# Patient Record
Sex: FEMALE | Race: WHITE | NOT HISPANIC OR LATINO | Employment: OTHER | URBAN - METROPOLITAN AREA
[De-identification: names, ages, dates, MRNs, and addresses within clinical notes are randomized per-mention and may not be internally consistent; named-entity substitution may affect disease eponyms.]

---

## 2020-04-21 ENCOUNTER — HOSPITAL ENCOUNTER (OUTPATIENT)
Dept: CARDIOLOGY | Facility: HOSPITAL | Age: 85
Discharge: HOME | End: 2020-04-21
Attending: INTERNAL MEDICINE
Payer: MEDICARE

## 2020-04-21 DIAGNOSIS — I10 HYPERTENSION: ICD-10-CM

## 2020-04-21 DIAGNOSIS — I10 HYPERTENSION: Primary | ICD-10-CM

## 2020-04-21 PROCEDURE — 93005 ELECTROCARDIOGRAM TRACING: CPT

## 2020-07-25 ENCOUNTER — HOSPITAL ENCOUNTER (EMERGENCY)
Facility: HOSPITAL | Age: 85
Discharge: HOME | End: 2020-07-25
Attending: EMERGENCY MEDICINE
Payer: MEDICARE

## 2020-07-25 VITALS
TEMPERATURE: 98 F | OXYGEN SATURATION: 96 % | DIASTOLIC BLOOD PRESSURE: 74 MMHG | RESPIRATION RATE: 18 BRPM | HEART RATE: 74 BPM | WEIGHT: 250 LBS | SYSTOLIC BLOOD PRESSURE: 153 MMHG

## 2020-07-25 DIAGNOSIS — S05.12XA PERIORBITAL CONTUSION OF LEFT EYE, INITIAL ENCOUNTER: ICD-10-CM

## 2020-07-25 DIAGNOSIS — M25.571 BILATERAL ANKLE PAIN: ICD-10-CM
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
## 2020-07-25 DIAGNOSIS — M25.561 BILATERAL KNEE PAIN: ICD-10-CM

## 2020-07-25 DIAGNOSIS — R07.81 RIB PAIN ON LEFT SIDE: ICD-10-CM

## 2020-07-25 DIAGNOSIS — M25.572 BILATERAL ANKLE PAIN: ICD-10-CM

## 2020-07-25 DIAGNOSIS — M25.562 BILATERAL KNEE PAIN: ICD-10-CM

## 2020-07-25 LAB
ANION GAP SERPL CALC-SCNC: 10 MMOL/L (ref 8–16)
BASOPHILS # BLD AUTO: 0.04 K/UL (ref 0–0.2)
BASOPHILS NFR BLD: 0.7 % (ref 0–1.9)
BUN SERPL-MCNC: 19 MG/DL (ref 8–23)
CALCIUM SERPL-MCNC: 9.2 MG/DL (ref 8.7–10.5)
CHLORIDE SERPL-SCNC: 102 MMOL/L (ref 95–110)
CK SERPL-CCNC: 57 U/L (ref 20–180)
CO2 SERPL-SCNC: 26 MMOL/L (ref 23–29)
CREAT SERPL-MCNC: 0.7 MG/DL (ref 0.5–1.4)
DIFFERENTIAL METHOD: ABNORMAL
EOSINOPHIL # BLD AUTO: 0.1 K/UL (ref 0–0.5)
EOSINOPHIL NFR BLD: 2 % (ref 0–8)
ERYTHROCYTE [DISTWIDTH] IN BLOOD BY AUTOMATED COUNT: 13.8 % (ref 11.5–14.5)
EST. GFR  (AFRICAN AMERICAN): >60 ML/MIN/1.73 M^2
EST. GFR  (NON AFRICAN AMERICAN): >60 ML/MIN/1.73 M^2
GLUCOSE SERPL-MCNC: 107 MG/DL (ref 70–110)
HCT VFR BLD AUTO: 37.9 % (ref 37–48.5)
HGB BLD-MCNC: 12.3 G/DL (ref 12–16)
IMM GRANULOCYTES # BLD AUTO: 0.04 K/UL (ref 0–0.04)
IMM GRANULOCYTES NFR BLD AUTO: 0.7 % (ref 0–0.5)
INR PPP: 1 (ref 0.8–1.2)
LYMPHOCYTES # BLD AUTO: 1.6 K/UL (ref 1–4.8)
LYMPHOCYTES NFR BLD: 26.2 % (ref 18–48)
MCH RBC QN AUTO: 30.1 PG (ref 27–31)
MCHC RBC AUTO-ENTMCNC: 32.5 G/DL (ref 32–36)
MCV RBC AUTO: 93 FL (ref 82–98)
MONOCYTES # BLD AUTO: 0.6 K/UL (ref 0.3–1)
MONOCYTES NFR BLD: 9.6 % (ref 4–15)
NEUTROPHILS # BLD AUTO: 3.7 K/UL (ref 1.8–7.7)
NEUTROPHILS NFR BLD: 60.8 % (ref 38–73)
NRBC BLD-RTO: 0 /100 WBC
PLATELET # BLD AUTO: 242 K/UL (ref 150–350)
PMV BLD AUTO: 10.4 FL (ref 9.2–12.9)
POTASSIUM SERPL-SCNC: 4 MMOL/L (ref 3.5–5.1)
PROTHROMBIN TIME: 10.4 SEC (ref 9–12.5)
RBC # BLD AUTO: 4.08 M/UL (ref 4–5.4)
SODIUM SERPL-SCNC: 138 MMOL/L (ref 136–145)
WBC # BLD AUTO: 6.04 K/UL (ref 3.9–12.7)

## 2020-07-25 PROCEDURE — 71045 XR CHEST AP PORTABLE: ICD-10-PCS | Mod: 26,,, | Performed by: RADIOLOGY

## 2020-07-25 PROCEDURE — 73030 X-RAY EXAM OF SHOULDER: CPT | Mod: TC,50,FY

## 2020-07-25 PROCEDURE — 99284 EMERGENCY DEPT VISIT MOD MDM: CPT | Mod: 25

## 2020-07-25 PROCEDURE — 73610 XR ANKLE COMPLETE 3 VIEW BILATERAL: ICD-10-PCS | Mod: 26,50,, | Performed by: RADIOLOGY

## 2020-07-25 PROCEDURE — 72125 CT NECK SPINE W/O DYE: CPT | Mod: TC

## 2020-07-25 PROCEDURE — 25000003 PHARM REV CODE 250: Performed by: EMERGENCY MEDICINE

## 2020-07-25 PROCEDURE — 73610 X-RAY EXAM OF ANKLE: CPT | Mod: 26,50,, | Performed by: RADIOLOGY

## 2020-07-25 PROCEDURE — 70486 CT MAXILLOFACIAL WITHOUT CONTRAST: ICD-10-PCS | Mod: 26,,, | Performed by: RADIOLOGY

## 2020-07-25 PROCEDURE — 73562 X-RAY EXAM OF KNEE 3: CPT | Mod: 26,50,, | Performed by: RADIOLOGY

## 2020-07-25 PROCEDURE — 73030 XR SHOULDER COMPLETE 2 OR MORE VIEWS BILATERAL: ICD-10-PCS | Mod: 26,50,, | Performed by: RADIOLOGY

## 2020-07-25 PROCEDURE — 70486 CT MAXILLOFACIAL W/O DYE: CPT | Mod: TC

## 2020-07-25 PROCEDURE — 71045 X-RAY EXAM CHEST 1 VIEW: CPT | Mod: 26,,, | Performed by: RADIOLOGY

## 2020-07-25 PROCEDURE — 82550 ASSAY OF CK (CPK): CPT

## 2020-07-25 PROCEDURE — 72125 CT NECK SPINE W/O DYE: CPT | Mod: 26,,, | Performed by: RADIOLOGY

## 2020-07-25 PROCEDURE — 71045 X-RAY EXAM CHEST 1 VIEW: CPT | Mod: TC,FY

## 2020-07-25 PROCEDURE — 73562 X-RAY EXAM OF KNEE 3: CPT | Mod: TC,50,FY

## 2020-07-25 PROCEDURE — 70450 CT HEAD WITHOUT CONTRAST: ICD-10-PCS | Mod: 26,,, | Performed by: RADIOLOGY

## 2020-07-25 PROCEDURE — 70450 CT HEAD/BRAIN W/O DYE: CPT | Mod: 26,,, | Performed by: RADIOLOGY

## 2020-07-25 PROCEDURE — 80048 BASIC METABOLIC PNL TOTAL CA: CPT

## 2020-07-25 PROCEDURE — 73562 XR KNEE 3 VIEW BILATERAL: ICD-10-PCS | Mod: 26,50,, | Performed by: RADIOLOGY

## 2020-07-25 PROCEDURE — 85025 COMPLETE CBC W/AUTO DIFF WBC: CPT

## 2020-07-25 PROCEDURE — 70450 CT HEAD/BRAIN W/O DYE: CPT | Mod: TC

## 2020-07-25 PROCEDURE — 85610 PROTHROMBIN TIME: CPT

## 2020-07-25 PROCEDURE — 73030 X-RAY EXAM OF SHOULDER: CPT | Mod: 26,50,, | Performed by: RADIOLOGY

## 2020-07-25 PROCEDURE — 72125 CT CERVICAL SPINE WITHOUT CONTRAST: ICD-10-PCS | Mod: 26,,, | Performed by: RADIOLOGY

## 2020-07-25 PROCEDURE — 70486 CT MAXILLOFACIAL W/O DYE: CPT | Mod: 26,,, | Performed by: RADIOLOGY

## 2020-07-25 PROCEDURE — 73610 X-RAY EXAM OF ANKLE: CPT | Mod: TC,50,FY

## 2020-07-25 RX ORDER — HYDROCODONE BITARTRATE AND ACETAMINOPHEN 5; 325 MG/1; MG/1
1 TABLET ORAL
Status: COMPLETED | OUTPATIENT
Start: 2020-07-25 | End: 2020-07-25

## 2020-07-25 RX ADMIN — HYDROCODONE BITARTRATE AND ACETAMINOPHEN 1 TABLET: 5; 325 TABLET ORAL at 07:07

## 2020-07-25 NOTE — ED NOTES
[facility] phone number [phone]. Daughter in ED parking lot for any questions or needs.

## 2020-07-25 NOTE — ED PROVIDER NOTES
Encounter Date: 7/25/2020       History     Chief Complaint   Patient presents with    Fall     ground level trip and fall, down for unk length of time, unk loc, c/o pain in b/l arm, b/l knee, L shoulder, and bruising to left orbit     88-year-old female no medical history presents after falling earlier today.  She reports a mechanical fall.  She states she hit car alarm after she fell and her neighbor's heard the car alarm and checked on her.  There seems to have been a loss of consciousness for an uncertain amount of time.  Complaining of a headache also complaining of bilateral shoulder pain bilateral knee pain bilateral ankle pain.  She does admit she has chronic shoulder and knee pain.  It is uncertain whether the pain has changed after the fall.  She denies any neck pain at this time.  Also complaining of pain under the left breast but no chest pain or shortness of breath.          Review of patient's allergies indicates:   Allergen Reactions    Morphine     Sulfa (sulfonamide antibiotics)      No past medical history on file.  No past surgical history on file.  No family history on file.  Social History     Tobacco Use    Smoking status: Not on file   Substance Use Topics    Alcohol use: Not on file    Drug use: Not on file     Review of Systems   Constitutional: Negative for diaphoresis.   HENT: Negative for facial swelling.    Eyes: Negative for pain.   Respiratory: Negative for shortness of breath.    Cardiovascular: Positive for chest pain.   Gastrointestinal: Negative for abdominal pain.   Genitourinary: Negative for flank pain.   Musculoskeletal: Positive for arthralgias. Negative for back pain, joint swelling and neck pain.   Neurological: Negative for headaches.   Hematological: Does not bruise/bleed easily.   Psychiatric/Behavioral: Negative for confusion.   All other systems reviewed and are negative.      Physical Exam     Initial Vitals   BP Pulse Resp Temp SpO2   07/25/20 1817 07/25/20 1817  07/25/20 1817 07/25/20 1817 07/25/20 1808   (!) 200/110 68 18 98.2 °F (36.8 °C) 95 %      MAP       --                Physical Exam    Nursing note and vitals reviewed.  Constitutional: She appears well-developed and well-nourished. She is not diaphoretic.  Non-toxic appearance. She does not have a sickly appearance. She does not appear ill. No distress.   HENT:   Head: Normocephalic. Head is with contusion. Head is without raccoon's eyes, without Gilmore's sign and without laceration.       Minimal amount of left periorbital bruising   Eyes: EOM are normal. Pupils are equal, round, and reactive to light. Right pupil is round and reactive. Left pupil is round and reactive. Pupils are equal.   Neck: Normal range of motion and full passive range of motion without pain. Neck supple. No spinous process tenderness and no muscular tenderness present. Normal range of motion present. No neck rigidity.   Cardiovascular: Normal rate, regular rhythm and normal heart sounds.   Pulmonary/Chest: Breath sounds normal. No respiratory distress. She has no decreased breath sounds.   Breath sounds clear bilaterally   Abdominal: Soft. She exhibits no distension. There is no abdominal tenderness.   Musculoskeletal: No edema.      Right shoulder: She exhibits decreased range of motion, tenderness and bony tenderness. She exhibits no swelling.      Left shoulder: She exhibits decreased range of motion, tenderness and bony tenderness.      Right elbow: Normal.     Left elbow: Normal.      Right wrist: Normal.      Left wrist: Normal.      Right hip: Normal.      Left hip: Normal.      Right knee: She exhibits normal range of motion, no swelling and no effusion. Tenderness found.      Left knee: She exhibits normal range of motion, no swelling and no effusion. Tenderness found.        Right ankle: Tenderness.        Left ankle: Tenderness.      Comments: Patient has tenderness to both shoulders both knees both ankles.  No obvious  deformities.  Decreased ability to elevate both arms secondary to pain.  Minimal tenderness bilateral deltoids.    Postoperative right and left TKA.  No obvious trauma to the knees but globally tender    Both ankles globally tender without any obvious trauma noted   Neurological: She is alert and oriented to person, place, and time.   Skin: Skin is warm and dry.   Psychiatric: She has a normal mood and affect. Her behavior is normal. Judgment and thought content normal.         ED Course   Procedures  Labs Reviewed - No data to display       Imaging Results    None                            ED Course as of Jul 25 2128   Sat Jul 25, 2020 1823 BP(!): 200/110 [EF]   1823 Temp: 98.2 °F (36.8 °C) [EF]   1823 Temp src: Oral [EF]   1823 Pulse: 68 [EF]   1823 Resp: 18 [EF]   1823 SpO2: 95 % [EF]   1908 CPK: 57 [EF]   1908 Sodium: 138 [EF]   1908 Potassium: 4.0 [EF]   1908 Chloride: 102 [EF]   1908 CO2: 26 [EF]   1908 Glucose: 107 [EF]   1908 BUN, Bld: 19 [EF]   1908 Creatinine: 0.7 [EF]   1908 Calcium: 9.2 [EF]   1908 Hemoglobin: 12.3 [EF]   1928 IMPRESSION:  1. No acute fracture or malalignment.  2. 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  Thank you for allowing us to participate in the care of your patient.  Dictated and Authenticated by: Marybeth Steen MD  07/25/2020 7:28 PM Central Time ([place] & [place])    [EF]   1929 IMPRESSION:  1. No acute intracranial findings.  2. Mild chronic microangiopathic changes, cortical volume loss.  Thank you for allowing us to participate in the care of your patient.  Dictated and Authenticated by: Marybeth Steen MD  07/25/2020 7:24 PM Central Time ([place] & [place])    [EF]   1936 IMPRESSION:  1. No acute fracture or dislocation.  2. Soft tissue contusions.  Thank you for allowing us to participate in the care of your patient.  Dictated and Authenticated by: Marybeth Steen MD  07/25/2020 7:31 PM Central Time ([place] & [place])    [EF]    1941 Family updated by nursing staff    [EF]   2002 Ankles normal xray    [EF]   2003 B knees normal    [EF]   2005 Unable to view any old images      [EF]   2006 B shoulders nothing acute, severe arthritic changes, no fx or dislocation seen    [EF]   2007 No acute findings on any imaging studies    [EF]   2044 BP(!): 153/74 [EF]   2103 Patient wasStand up and ambulate with minimal assistance.  She can be discharged home.    [EF]      ED Course User Index  [EF] Ricardo Villa MD                Clinical Impression:       ICD-10-CM ICD-9-CM   1. Fall, initial encounter  W19.XXXA E888.9   2. Bilateral knee pain  M25.561 719.46    M25.562    3. Bilateral ankle pain  M25.571 719.47    M25.572    4. Rib pain on left side  R07.81 786.50   5. Periorbital contusion of left eye, initial encounter  S05.12XA 921.1                         88-year-old female presents after mechanical fall.  Complaining of a very mild headache and also bilateral shoulder bilateral knee bilateral ankle pain.  All imaging studies unremarkable.  Labs are normal.  Well-appearing in the ER.  On discharge she is ambulatory with a walker.  There is no sign of any posttraumatic injury at all.  X-rays will be formally read tomorrow.  She is advised if there is a discrepancy she will be contacted.  I see no reason for a sling or splint at this time.  My read of x-rays demonstrate no acute findings.  Clinically she does not demonstrate any evidence of a fracture or dislocation.  CT head neck max face all read by Radiology are unremarkable.  No indication for any further studies or admission for any reason.     Ricardo Villa MD  07/25/20 4228

## 2020-10-22 DIAGNOSIS — H50.22 HYPOTROPIA OF LEFT EYE: Primary | ICD-10-CM

## 2020-11-04 ENCOUNTER — HOSPITAL ENCOUNTER (OUTPATIENT)
Dept: RADIOLOGY | Facility: HOSPITAL | Age: 85
Discharge: HOME | End: 2020-11-04
Attending: OPHTHALMOLOGY
Payer: MEDICARE

## 2020-11-04 DIAGNOSIS — H50.22 HYPOTROPIA OF LEFT EYE: ICD-10-CM

## 2020-11-04 PROCEDURE — 70553 MRI BRAIN W WO CONTRAST: ICD-10-PCS | Mod: 26,,, | Performed by: RADIOLOGY

## 2020-11-04 PROCEDURE — 25500020 PHARM REV CODE 255

## 2020-11-04 PROCEDURE — 70553 MRI BRAIN STEM W/O & W/DYE: CPT | Mod: TC

## 2020-11-04 PROCEDURE — A9585 GADOBUTROL INJECTION: HCPCS

## 2020-11-04 PROCEDURE — 70543 MRI ORBITS W W/O CONTRAST: ICD-10-PCS | Mod: 26,,, | Performed by: RADIOLOGY

## 2020-11-04 PROCEDURE — 70553 MRI BRAIN STEM W/O & W/DYE: CPT | Mod: 26,,, | Performed by: RADIOLOGY

## 2020-11-04 PROCEDURE — 70543 MRI ORBT/FAC/NCK W/O &W/DYE: CPT | Mod: 26,,, | Performed by: RADIOLOGY

## 2020-11-04 PROCEDURE — 70543 MRI ORBT/FAC/NCK W/O &W/DYE: CPT | Mod: TC

## 2020-11-04 RX ORDER — GADOBUTROL 604.72 MG/ML
10 INJECTION INTRAVENOUS
Status: COMPLETED | OUTPATIENT
Start: 2020-11-04 | End: 2020-11-04

## 2020-11-04 RX ADMIN — GADOBUTROL 10 ML: 604.72 INJECTION INTRAVENOUS at 03:11

## 2021-11-07 ENCOUNTER — HOSPITAL ENCOUNTER (INPATIENT)
Facility: HOSPITAL | Age: 86
LOS: 5 days | Discharge: NURSING, REHAB OR LONG-TERM CARE | DRG: 083 | End: 2021-11-12
Attending: EMERGENCY MEDICINE | Admitting: PSYCHIATRY & NEUROLOGY
Payer: MEDICARE

## 2021-11-07 DIAGNOSIS — I34.1 MITRAL VALVE PROLAPSE: ICD-10-CM

## 2021-11-07 DIAGNOSIS — S06.5XAA SDH (SUBDURAL HEMATOMA): ICD-10-CM

## 2021-11-07 DIAGNOSIS — S06.5XAA SUBDURAL HEMATOMA, ACUTE: ICD-10-CM

## 2021-11-07 DIAGNOSIS — I10 HYPERTENSION, UNSPECIFIED TYPE: ICD-10-CM

## 2021-11-07 PROBLEM — C50.119 PRIMARY MALIGNANT NEOPLASM OF CENTRAL PORTION OF FEMALE BREAST: Status: ACTIVE | Noted: 2021-11-07

## 2021-11-07 PROBLEM — M71.9 BURSITIS: Status: ACTIVE | Noted: 2021-11-07

## 2021-11-07 PROBLEM — E87.6 HYPOKALEMIA: Status: ACTIVE | Noted: 2021-11-07

## 2021-11-07 PROBLEM — G56.00 CARPAL TUNNEL SYNDROME: Status: ACTIVE | Noted: 2021-11-07

## 2021-11-07 PROBLEM — F43.21 ADJUSTMENT DISORDER WITH DEPRESSED MOOD: Status: ACTIVE | Noted: 2021-11-07

## 2021-11-07 PROBLEM — J43.9 PULMONARY EMPHYSEMA: Status: ACTIVE | Noted: 2021-11-07

## 2021-11-07 LAB
ABO + RH BLD: NORMAL
ALBUMIN SERPL BCP-MCNC: 3.3 G/DL (ref 3.5–5.2)
ALP SERPL-CCNC: 86 U/L (ref 55–135)
ALT SERPL W/O P-5'-P-CCNC: 12 U/L (ref 10–44)
ANION GAP SERPL CALC-SCNC: 10 MMOL/L (ref 8–16)
APTT BLDCRRT: 21.9 SEC (ref 21–32)
AST SERPL-CCNC: 16 U/L (ref 10–40)
BACTERIA #/AREA URNS AUTO: ABNORMAL /HPF
BASOPHILS # BLD AUTO: 0.04 K/UL (ref 0–0.2)
BASOPHILS NFR BLD: 0.8 % (ref 0–1.9)
BILIRUB SERPL-MCNC: 0.7 MG/DL (ref 0.1–1)
BILIRUB UR QL STRIP: NEGATIVE
BLD GP AB SCN CELLS X3 SERPL QL: NORMAL
BUN SERPL-MCNC: 21 MG/DL (ref 8–23)
CALCIUM SERPL-MCNC: 9.3 MG/DL (ref 8.7–10.5)
CHLORIDE SERPL-SCNC: 106 MMOL/L (ref 95–110)
CHOLEST SERPL-MCNC: 187 MG/DL (ref 120–199)
CHOLEST/HDLC SERPL: 2.5 {RATIO} (ref 2–5)
CLARITY UR REFRACT.AUTO: ABNORMAL
CO2 SERPL-SCNC: 25 MMOL/L (ref 23–29)
COLOR UR AUTO: YELLOW
CREAT SERPL-MCNC: 0.8 MG/DL (ref 0.5–1.4)
CTP QC/QA: YES
DIFFERENTIAL METHOD: ABNORMAL
EOSINOPHIL # BLD AUTO: 0.1 K/UL (ref 0–0.5)
EOSINOPHIL NFR BLD: 1.7 % (ref 0–8)
ERYTHROCYTE [DISTWIDTH] IN BLOOD BY AUTOMATED COUNT: 19.9 % (ref 11.5–14.5)
EST. GFR  (AFRICAN AMERICAN): >60 ML/MIN/1.73 M^2
EST. GFR  (NON AFRICAN AMERICAN): >60 ML/MIN/1.73 M^2
ESTIMATED AVG GLUCOSE: 108 MG/DL (ref 68–131)
GLUCOSE SERPL-MCNC: 103 MG/DL (ref 70–110)
GLUCOSE UR QL STRIP: NEGATIVE
HBA1C MFR BLD: 5.4 % (ref 4–5.6)
HCT VFR BLD AUTO: 32.1 % (ref 37–48.5)
HDLC SERPL-MCNC: 74 MG/DL (ref 40–75)
HDLC SERPL: 39.6 % (ref 20–50)
HGB BLD-MCNC: 10.5 G/DL (ref 12–16)
HGB UR QL STRIP: ABNORMAL
HYALINE CASTS UR QL AUTO: 3 /LPF
IMM GRANULOCYTES # BLD AUTO: 0.06 K/UL (ref 0–0.04)
IMM GRANULOCYTES NFR BLD AUTO: 1.1 % (ref 0–0.5)
INR PPP: 1 (ref 0.8–1.2)
KETONES UR QL STRIP: NEGATIVE
LDLC SERPL CALC-MCNC: 101.8 MG/DL (ref 63–159)
LEUKOCYTE ESTERASE UR QL STRIP: ABNORMAL
LYMPHOCYTES # BLD AUTO: 1.4 K/UL (ref 1–4.8)
LYMPHOCYTES NFR BLD: 27.4 % (ref 18–48)
MCH RBC QN AUTO: 32 PG (ref 27–31)
MCHC RBC AUTO-ENTMCNC: 32.7 G/DL (ref 32–36)
MCV RBC AUTO: 98 FL (ref 82–98)
MICROSCOPIC COMMENT: ABNORMAL
MONOCYTES # BLD AUTO: 0.5 K/UL (ref 0.3–1)
MONOCYTES NFR BLD: 10.1 % (ref 4–15)
NEUTROPHILS # BLD AUTO: 3.1 K/UL (ref 1.8–7.7)
NEUTROPHILS NFR BLD: 58.9 % (ref 38–73)
NITRITE UR QL STRIP: NEGATIVE
NONHDLC SERPL-MCNC: 113 MG/DL
NRBC BLD-RTO: 0 /100 WBC
PH UR STRIP: 5 [PH] (ref 5–8)
PLATELET # BLD AUTO: 203 K/UL (ref 150–450)
PMV BLD AUTO: 10.2 FL (ref 9.2–12.9)
POTASSIUM SERPL-SCNC: 3.4 MMOL/L (ref 3.5–5.1)
PROT SERPL-MCNC: 6.1 G/DL (ref 6–8.4)
PROT UR QL STRIP: ABNORMAL
PROTHROMBIN TIME: 11.4 SEC (ref 9–12.5)
RBC # BLD AUTO: 3.28 M/UL (ref 4–5.4)
RBC #/AREA URNS AUTO: >100 /HPF (ref 0–4)
SARS-COV-2 RDRP RESP QL NAA+PROBE: NEGATIVE
SODIUM SERPL-SCNC: 141 MMOL/L (ref 136–145)
SP GR UR STRIP: 1.01 (ref 1–1.03)
SQUAMOUS #/AREA URNS AUTO: 0 /HPF
TRIGL SERPL-MCNC: 56 MG/DL (ref 30–150)
TSH SERPL DL<=0.005 MIU/L-ACNC: 0.98 UIU/ML (ref 0.4–4)
URN SPEC COLLECT METH UR: ABNORMAL
WBC # BLD AUTO: 5.26 K/UL (ref 3.9–12.7)
WBC #/AREA URNS AUTO: 17 /HPF (ref 0–5)

## 2021-11-07 PROCEDURE — 99223 1ST HOSP IP/OBS HIGH 75: CPT | Mod: GC,,, | Performed by: NEUROLOGICAL SURGERY

## 2021-11-07 PROCEDURE — 80053 COMPREHEN METABOLIC PANEL: CPT | Performed by: PHYSICIAN ASSISTANT

## 2021-11-07 PROCEDURE — 81001 URINALYSIS AUTO W/SCOPE: CPT | Performed by: NURSE PRACTITIONER

## 2021-11-07 PROCEDURE — 93010 ELECTROCARDIOGRAM REPORT: CPT | Mod: ,,, | Performed by: INTERNAL MEDICINE

## 2021-11-07 PROCEDURE — 87086 URINE CULTURE/COLONY COUNT: CPT | Performed by: NURSE PRACTITIONER

## 2021-11-07 PROCEDURE — 80061 LIPID PANEL: CPT | Performed by: PHYSICIAN ASSISTANT

## 2021-11-07 PROCEDURE — 99285 EMERGENCY DEPT VISIT HI MDM: CPT | Mod: 25

## 2021-11-07 PROCEDURE — 93010 EKG 12-LEAD: ICD-10-PCS | Mod: ,,, | Performed by: INTERNAL MEDICINE

## 2021-11-07 PROCEDURE — U0002 COVID-19 LAB TEST NON-CDC: HCPCS | Performed by: PHYSICIAN ASSISTANT

## 2021-11-07 PROCEDURE — 85730 THROMBOPLASTIN TIME PARTIAL: CPT | Performed by: PHYSICIAN ASSISTANT

## 2021-11-07 PROCEDURE — 86900 BLOOD TYPING SEROLOGIC ABO: CPT | Performed by: PHYSICIAN ASSISTANT

## 2021-11-07 PROCEDURE — 96375 TX/PRO/DX INJ NEW DRUG ADDON: CPT

## 2021-11-07 PROCEDURE — 85025 COMPLETE CBC W/AUTO DIFF WBC: CPT | Performed by: PHYSICIAN ASSISTANT

## 2021-11-07 PROCEDURE — 99223 PR INITIAL HOSPITAL CARE,LEVL III: ICD-10-PCS | Mod: GC,,, | Performed by: NEUROLOGICAL SURGERY

## 2021-11-07 PROCEDURE — 63600175 PHARM REV CODE 636 W HCPCS: Mod: JG | Performed by: PHYSICIAN ASSISTANT

## 2021-11-07 PROCEDURE — 93005 ELECTROCARDIOGRAM TRACING: CPT

## 2021-11-07 PROCEDURE — 83036 HEMOGLOBIN GLYCOSYLATED A1C: CPT | Performed by: PHYSICIAN ASSISTANT

## 2021-11-07 PROCEDURE — 12000002 HC ACUTE/MED SURGE SEMI-PRIVATE ROOM

## 2021-11-07 PROCEDURE — 84443 ASSAY THYROID STIM HORMONE: CPT | Performed by: PHYSICIAN ASSISTANT

## 2021-11-07 PROCEDURE — 25000003 PHARM REV CODE 250: Performed by: PHYSICIAN ASSISTANT

## 2021-11-07 PROCEDURE — 85610 PROTHROMBIN TIME: CPT | Performed by: PHYSICIAN ASSISTANT

## 2021-11-07 PROCEDURE — 99291 CRITICAL CARE FIRST HOUR: CPT | Mod: CS,,, | Performed by: EMERGENCY MEDICINE

## 2021-11-07 PROCEDURE — 99291 PR CRITICAL CARE, E/M 30-74 MINUTES: ICD-10-PCS | Mod: CS,,, | Performed by: EMERGENCY MEDICINE

## 2021-11-07 PROCEDURE — 96365 THER/PROPH/DIAG IV INF INIT: CPT

## 2021-11-07 RX ORDER — SODIUM,POTASSIUM PHOSPHATES 280-250MG
2 POWDER IN PACKET (EA) ORAL
Status: DISCONTINUED | OUTPATIENT
Start: 2021-11-07 | End: 2021-11-10

## 2021-11-07 RX ORDER — FUROSEMIDE 40 MG/1
TABLET ORAL
COMMUNITY
Start: 2021-07-09 | End: 2022-09-01 | Stop reason: SDUPTHER

## 2021-11-07 RX ORDER — ASPIRIN 81 MG/1
81 TABLET ORAL
Status: ON HOLD | COMMUNITY
Start: 2021-01-28 | End: 2021-11-11 | Stop reason: HOSPADM

## 2021-11-07 RX ORDER — ALBUTEROL SULFATE 90 UG/1
1 AEROSOL, METERED RESPIRATORY (INHALATION)
COMMUNITY
Start: 2021-03-11

## 2021-11-07 RX ORDER — HYDRALAZINE HYDROCHLORIDE 50 MG/1
50 TABLET, FILM COATED ORAL
Status: ON HOLD | COMMUNITY
Start: 2021-03-11 | End: 2021-11-11 | Stop reason: SDUPTHER

## 2021-11-07 RX ORDER — AMLODIPINE BESYLATE 5 MG/1
TABLET ORAL
COMMUNITY

## 2021-11-07 RX ORDER — AMOXICILLIN 250 MG
1 CAPSULE ORAL 2 TIMES DAILY
Status: DISCONTINUED | OUTPATIENT
Start: 2021-11-07 | End: 2021-11-12 | Stop reason: HOSPADM

## 2021-11-07 RX ORDER — CAPECITABINE 500 MG/1
1500 TABLET, FILM COATED ORAL
COMMUNITY
Start: 2021-08-23

## 2021-11-07 RX ORDER — LOSARTAN POTASSIUM AND HYDROCHLOROTHIAZIDE 12.5; 1 MG/1; MG/1
TABLET ORAL
COMMUNITY

## 2021-11-07 RX ORDER — CITALOPRAM 10 MG/1
TABLET ORAL
COMMUNITY

## 2021-11-07 RX ORDER — LABETALOL HCL 20 MG/4 ML
10 SYRINGE (ML) INTRAVENOUS EVERY 4 HOURS PRN
Status: DISCONTINUED | OUTPATIENT
Start: 2021-11-07 | End: 2021-11-08

## 2021-11-07 RX ORDER — LANOLIN ALCOHOL/MO/W.PET/CERES
800 CREAM (GRAM) TOPICAL
Status: DISCONTINUED | OUTPATIENT
Start: 2021-11-07 | End: 2021-11-10

## 2021-11-07 RX ORDER — CLONIDINE HYDROCHLORIDE 0.1 MG/1
0.1 TABLET ORAL
COMMUNITY
Start: 2021-03-11

## 2021-11-07 RX ORDER — NITROGLYCERIN 0.4 MG/1
TABLET SUBLINGUAL
COMMUNITY
Start: 2021-06-10

## 2021-11-07 RX ORDER — SODIUM CHLORIDE 0.9 % (FLUSH) 0.9 %
10 SYRINGE (ML) INJECTION
Status: DISCONTINUED | OUTPATIENT
Start: 2021-11-07 | End: 2021-11-12 | Stop reason: HOSPADM

## 2021-11-07 RX ORDER — LEVETIRACETAM 500 MG/1
500 TABLET ORAL 2 TIMES DAILY
Status: DISCONTINUED | OUTPATIENT
Start: 2021-11-07 | End: 2021-11-09

## 2021-11-07 RX ORDER — POLYETHYLENE GLYCOL 3350 17 G/17G
17 POWDER, FOR SOLUTION ORAL DAILY
Status: DISCONTINUED | OUTPATIENT
Start: 2021-11-08 | End: 2021-11-12 | Stop reason: HOSPADM

## 2021-11-07 RX ORDER — ACETAMINOPHEN 325 MG/1
650 TABLET ORAL EVERY 6 HOURS PRN
Status: DISCONTINUED | OUTPATIENT
Start: 2021-11-07 | End: 2021-11-12 | Stop reason: HOSPADM

## 2021-11-07 RX ADMIN — DESMOPRESSIN ACETATE 34.02 MCG: 4 SOLUTION INTRAVENOUS at 04:11

## 2021-11-08 LAB
ALBUMIN SERPL BCP-MCNC: 3.2 G/DL (ref 3.5–5.2)
ALP SERPL-CCNC: 80 U/L (ref 55–135)
ALT SERPL W/O P-5'-P-CCNC: 11 U/L (ref 10–44)
ANION GAP SERPL CALC-SCNC: 8 MMOL/L (ref 8–16)
APTT BLDCRRT: 23.9 SEC (ref 21–32)
ASCENDING AORTA: 2.62 CM
AST SERPL-CCNC: 15 U/L (ref 10–40)
AV INDEX (PROSTH): 0.61
AV MEAN GRADIENT: 6 MMHG
AV PEAK GRADIENT: 10 MMHG
AV VALVE AREA: 1.78 CM2
AV VELOCITY RATIO: 0.61
BACTERIA UR CULT: NO GROWTH
BASOPHILS # BLD AUTO: 0.04 K/UL (ref 0–0.2)
BASOPHILS NFR BLD: 0.7 % (ref 0–1.9)
BILIRUB SERPL-MCNC: 0.8 MG/DL (ref 0.1–1)
BSA FOR ECHO PROCEDURE: 2.21 M2
BUN SERPL-MCNC: 22 MG/DL (ref 8–23)
CALCIUM SERPL-MCNC: 9 MG/DL (ref 8.7–10.5)
CHLORIDE SERPL-SCNC: 105 MMOL/L (ref 95–110)
CK MB SERPL-MCNC: 2 NG/ML (ref 0.1–6.5)
CK MB SERPL-RTO: 3 % (ref 0–5)
CK SERPL-CCNC: 66 U/L (ref 20–180)
CO2 SERPL-SCNC: 28 MMOL/L (ref 23–29)
CREAT SERPL-MCNC: 0.8 MG/DL (ref 0.5–1.4)
CV ECHO LV RWT: 0.4 CM
DIFFERENTIAL METHOD: ABNORMAL
DOP CALC AO PEAK VEL: 1.55 M/S
DOP CALC AO VTI: 39.19 CM
DOP CALC LVOT AREA: 2.9 CM2
DOP CALC LVOT DIAMETER: 1.93 CM
DOP CALC LVOT PEAK VEL: 0.95 M/S
DOP CALC LVOT STROKE VOLUME: 69.56 CM3
DOP CALCLVOT PEAK VEL VTI: 23.79 CM
E WAVE DECELERATION TIME: 234.14 MSEC
E/A RATIO: 0.77
E/E' RATIO: 12.22 M/S
ECHO LV POSTERIOR WALL: 0.8 CM (ref 0.6–1.1)
EJECTION FRACTION: 60 %
EOSINOPHIL # BLD AUTO: 0.1 K/UL (ref 0–0.5)
EOSINOPHIL NFR BLD: 1 % (ref 0–8)
ERYTHROCYTE [DISTWIDTH] IN BLOOD BY AUTOMATED COUNT: 20.3 % (ref 11.5–14.5)
EST. GFR  (AFRICAN AMERICAN): >60 ML/MIN/1.73 M^2
EST. GFR  (NON AFRICAN AMERICAN): >60 ML/MIN/1.73 M^2
FRACTIONAL SHORTENING: 36 % (ref 28–44)
GLUCOSE SERPL-MCNC: 113 MG/DL (ref 70–110)
HCT VFR BLD AUTO: 30.2 % (ref 37–48.5)
HGB BLD-MCNC: 9.5 G/DL (ref 12–16)
IMM GRANULOCYTES # BLD AUTO: 0.05 K/UL (ref 0–0.04)
IMM GRANULOCYTES NFR BLD AUTO: 0.9 % (ref 0–0.5)
INR PPP: 1 (ref 0.8–1.2)
INTERVENTRICULAR SEPTUM: 0.8 CM (ref 0.6–1.1)
LA MAJOR: 5.4 CM
LA MINOR: 5.28 CM
LA WIDTH: 3.71 CM
LEFT ATRIUM SIZE: 4.18 CM
LEFT ATRIUM VOLUME INDEX: 33.8 ML/M2
LEFT ATRIUM VOLUME: 70.38 CM3
LEFT INTERNAL DIMENSION IN SYSTOLE: 2.58 CM (ref 2.1–4)
LEFT VENTRICLE DIASTOLIC VOLUME INDEX: 35.49 ML/M2
LEFT VENTRICLE DIASTOLIC VOLUME: 73.82 ML
LEFT VENTRICLE MASS INDEX: 45 G/M2
LEFT VENTRICLE SYSTOLIC VOLUME INDEX: 11.6 ML/M2
LEFT VENTRICLE SYSTOLIC VOLUME: 24.08 ML
LEFT VENTRICULAR INTERNAL DIMENSION IN DIASTOLE: 4 CM (ref 3.5–6)
LEFT VENTRICULAR MASS: 93.46 G
LV LATERAL E/E' RATIO: 11 M/S
LV SEPTAL E/E' RATIO: 13.75 M/S
LYMPHOCYTES # BLD AUTO: 0.8 K/UL (ref 1–4.8)
LYMPHOCYTES NFR BLD: 14.3 % (ref 18–48)
MAGNESIUM SERPL-MCNC: 1.8 MG/DL (ref 1.6–2.6)
MAGNESIUM SERPL-MCNC: 1.8 MG/DL (ref 1.6–2.6)
MCH RBC QN AUTO: 31.8 PG (ref 27–31)
MCHC RBC AUTO-ENTMCNC: 31.5 G/DL (ref 32–36)
MCV RBC AUTO: 101 FL (ref 82–98)
MONOCYTES # BLD AUTO: 0.5 K/UL (ref 0.3–1)
MONOCYTES NFR BLD: 7.9 % (ref 4–15)
MV PEAK A VEL: 1.42 M/S
MV PEAK E VEL: 1.1 M/S
MV STENOSIS PRESSURE HALF TIME: 67.9 MS
MV VALVE AREA P 1/2 METHOD: 3.24 CM2
NEUTROPHILS # BLD AUTO: 4.4 K/UL (ref 1.8–7.7)
NEUTROPHILS NFR BLD: 75.2 % (ref 38–73)
NRBC BLD-RTO: 0 /100 WBC
PHOSPHATE SERPL-MCNC: 4.5 MG/DL (ref 2.7–4.5)
PHOSPHATE SERPL-MCNC: 4.5 MG/DL (ref 2.7–4.5)
PISA TR MAX VEL: 3.24 M/S
PLATELET # BLD AUTO: 174 K/UL (ref 150–450)
PMV BLD AUTO: 10.3 FL (ref 9.2–12.9)
POCT GLUCOSE: 134 MG/DL (ref 70–110)
POTASSIUM SERPL-SCNC: 3.9 MMOL/L (ref 3.5–5.1)
PROT SERPL-MCNC: 5.8 G/DL (ref 6–8.4)
PROTHROMBIN TIME: 11.4 SEC (ref 9–12.5)
RA MAJOR: 5.08 CM
RA PRESSURE: 15 MMHG
RA WIDTH: 3.32 CM
RBC # BLD AUTO: 2.99 M/UL (ref 4–5.4)
RIGHT VENTRICULAR END-DIASTOLIC DIMENSION: 3.25 CM
SINUS: 2.77 CM
SODIUM SERPL-SCNC: 141 MMOL/L (ref 136–145)
STJ: 2.77 CM
TDI LATERAL: 0.1 M/S
TDI SEPTAL: 0.08 M/S
TDI: 0.09 M/S
TR MAX PG: 42 MMHG
TRICUSPID ANNULAR PLANE SYSTOLIC EXCURSION: 2.59 CM
TROPONIN I SERPL DL<=0.01 NG/ML-MCNC: 0.16 NG/ML (ref 0–0.03)
TV REST PULMONARY ARTERY PRESSURE: 57 MMHG
WBC # BLD AUTO: 5.79 K/UL (ref 3.9–12.7)

## 2021-11-08 PROCEDURE — 80053 COMPREHEN METABOLIC PANEL: CPT | Performed by: NURSE PRACTITIONER

## 2021-11-08 PROCEDURE — 25000003 PHARM REV CODE 250: Performed by: NURSE PRACTITIONER

## 2021-11-08 PROCEDURE — 97112 NEUROMUSCULAR REEDUCATION: CPT

## 2021-11-08 PROCEDURE — 25000003 PHARM REV CODE 250: Performed by: STUDENT IN AN ORGANIZED HEALTH CARE EDUCATION/TRAINING PROGRAM

## 2021-11-08 PROCEDURE — 97161 PT EVAL LOW COMPLEX 20 MIN: CPT

## 2021-11-08 PROCEDURE — 97530 THERAPEUTIC ACTIVITIES: CPT

## 2021-11-08 PROCEDURE — 85730 THROMBOPLASTIN TIME PARTIAL: CPT | Performed by: NURSE PRACTITIONER

## 2021-11-08 PROCEDURE — 84484 ASSAY OF TROPONIN QUANT: CPT | Performed by: NURSE PRACTITIONER

## 2021-11-08 PROCEDURE — 85610 PROTHROMBIN TIME: CPT | Performed by: NURSE PRACTITIONER

## 2021-11-08 PROCEDURE — 20000000 HC ICU ROOM

## 2021-11-08 PROCEDURE — 99223 1ST HOSP IP/OBS HIGH 75: CPT | Mod: AI,GC,, | Performed by: PSYCHIATRY & NEUROLOGY

## 2021-11-08 PROCEDURE — 99223 PR INITIAL HOSPITAL CARE,LEVL III: ICD-10-PCS | Mod: AI,GC,, | Performed by: PSYCHIATRY & NEUROLOGY

## 2021-11-08 PROCEDURE — 92610 EVALUATE SWALLOWING FUNCTION: CPT

## 2021-11-08 PROCEDURE — 83735 ASSAY OF MAGNESIUM: CPT | Performed by: NURSE PRACTITIONER

## 2021-11-08 PROCEDURE — 85025 COMPLETE CBC W/AUTO DIFF WBC: CPT | Performed by: NURSE PRACTITIONER

## 2021-11-08 PROCEDURE — 82553 CREATINE MB FRACTION: CPT | Performed by: NURSE PRACTITIONER

## 2021-11-08 PROCEDURE — 84100 ASSAY OF PHOSPHORUS: CPT | Performed by: NURSE PRACTITIONER

## 2021-11-08 PROCEDURE — 97535 SELF CARE MNGMENT TRAINING: CPT

## 2021-11-08 PROCEDURE — 97165 OT EVAL LOW COMPLEX 30 MIN: CPT

## 2021-11-08 RX ORDER — CLONIDINE HYDROCHLORIDE 0.1 MG/1
0.1 TABLET ORAL 3 TIMES DAILY
Status: DISCONTINUED | OUTPATIENT
Start: 2021-11-08 | End: 2021-11-08

## 2021-11-08 RX ORDER — AMLODIPINE BESYLATE 5 MG/1
5 TABLET ORAL DAILY
Status: DISCONTINUED | OUTPATIENT
Start: 2021-11-08 | End: 2021-11-12 | Stop reason: HOSPADM

## 2021-11-08 RX ORDER — LABETALOL HCL 20 MG/4 ML
10 SYRINGE (ML) INTRAVENOUS EVERY 4 HOURS PRN
Status: DISCONTINUED | OUTPATIENT
Start: 2021-11-08 | End: 2021-11-10

## 2021-11-08 RX ORDER — CLONIDINE HYDROCHLORIDE 0.1 MG/1
0.1 TABLET ORAL 3 TIMES DAILY
Status: DISCONTINUED | OUTPATIENT
Start: 2021-11-08 | End: 2021-11-12 | Stop reason: HOSPADM

## 2021-11-08 RX ORDER — NICARDIPINE HYDROCHLORIDE 0.2 MG/ML
0-15 INJECTION INTRAVENOUS CONTINUOUS
Status: DISCONTINUED | OUTPATIENT
Start: 2021-11-08 | End: 2021-11-09

## 2021-11-08 RX ORDER — LOSARTAN POTASSIUM AND HYDROCHLOROTHIAZIDE 12.5; 1 MG/1; MG/1
1 TABLET ORAL DAILY
Status: DISCONTINUED | OUTPATIENT
Start: 2021-11-08 | End: 2021-11-12 | Stop reason: HOSPADM

## 2021-11-08 RX ORDER — HYDRALAZINE HYDROCHLORIDE 25 MG/1
50 TABLET, FILM COATED ORAL EVERY 8 HOURS
Status: DISCONTINUED | OUTPATIENT
Start: 2021-11-08 | End: 2021-11-11

## 2021-11-08 RX ADMIN — CLONIDINE HYDROCHLORIDE 0.1 MG: 0.1 TABLET ORAL at 04:11

## 2021-11-08 RX ADMIN — CLONIDINE HYDROCHLORIDE 0.1 MG: 0.1 TABLET ORAL at 09:11

## 2021-11-08 RX ADMIN — LOSARTAN POTASSIUM AND HYDROCHLOROTHIAZIDE 1 TABLET: 12.5; 1 TABLET ORAL at 12:11

## 2021-11-08 RX ADMIN — SENNOSIDES AND DOCUSATE SODIUM 1 TABLET: 8.6; 5 TABLET ORAL at 09:11

## 2021-11-08 RX ADMIN — POLYETHYLENE GLYCOL 3350 17 G: 17 POWDER, FOR SOLUTION ORAL at 11:11

## 2021-11-08 RX ADMIN — SENNOSIDES AND DOCUSATE SODIUM 1 TABLET: 8.6; 5 TABLET ORAL at 10:11

## 2021-11-08 RX ADMIN — LEVETIRACETAM 500 MG: 500 TABLET, FILM COATED ORAL at 09:11

## 2021-11-08 RX ADMIN — LABETALOL HYDROCHLORIDE 10 MG: 5 INJECTION, SOLUTION INTRAVENOUS at 09:11

## 2021-11-08 RX ADMIN — LEVETIRACETAM 500 MG: 500 TABLET, FILM COATED ORAL at 10:11

## 2021-11-08 RX ADMIN — AMLODIPINE BESYLATE 5 MG: 5 TABLET ORAL at 10:11

## 2021-11-08 RX ADMIN — HYDRALAZINE HYDROCHLORIDE 50 MG: 50 TABLET, FILM COATED ORAL at 02:11

## 2021-11-08 RX ADMIN — HYDRALAZINE HYDROCHLORIDE 50 MG: 50 TABLET, FILM COATED ORAL at 10:11

## 2021-11-09 LAB
ALBUMIN SERPL BCP-MCNC: 3.3 G/DL (ref 3.5–5.2)
ALP SERPL-CCNC: 78 U/L (ref 55–135)
ALT SERPL W/O P-5'-P-CCNC: 11 U/L (ref 10–44)
ANION GAP SERPL CALC-SCNC: 12 MMOL/L (ref 8–16)
AST SERPL-CCNC: 15 U/L (ref 10–40)
BASOPHILS # BLD AUTO: 0.01 K/UL (ref 0–0.2)
BASOPHILS NFR BLD: 0.2 % (ref 0–1.9)
BILIRUB SERPL-MCNC: 0.6 MG/DL (ref 0.1–1)
BUN SERPL-MCNC: 20 MG/DL (ref 8–23)
CALCIUM SERPL-MCNC: 9.1 MG/DL (ref 8.7–10.5)
CHLORIDE SERPL-SCNC: 103 MMOL/L (ref 95–110)
CO2 SERPL-SCNC: 26 MMOL/L (ref 23–29)
CREAT SERPL-MCNC: 0.7 MG/DL (ref 0.5–1.4)
DIFFERENTIAL METHOD: ABNORMAL
EOSINOPHIL # BLD AUTO: 0 K/UL (ref 0–0.5)
EOSINOPHIL NFR BLD: 0 % (ref 0–8)
ERYTHROCYTE [DISTWIDTH] IN BLOOD BY AUTOMATED COUNT: 20.5 % (ref 11.5–14.5)
EST. GFR  (AFRICAN AMERICAN): >60 ML/MIN/1.73 M^2
EST. GFR  (NON AFRICAN AMERICAN): >60 ML/MIN/1.73 M^2
GLUCOSE SERPL-MCNC: 148 MG/DL (ref 70–110)
HCT VFR BLD AUTO: 31.1 % (ref 37–48.5)
HGB BLD-MCNC: 10 G/DL (ref 12–16)
IMM GRANULOCYTES # BLD AUTO: 0.05 K/UL (ref 0–0.04)
IMM GRANULOCYTES NFR BLD AUTO: 0.9 % (ref 0–0.5)
LYMPHOCYTES # BLD AUTO: 0.7 K/UL (ref 1–4.8)
LYMPHOCYTES NFR BLD: 13.5 % (ref 18–48)
MAGNESIUM SERPL-MCNC: 2 MG/DL (ref 1.6–2.6)
MCH RBC QN AUTO: 32.4 PG (ref 27–31)
MCHC RBC AUTO-ENTMCNC: 32.2 G/DL (ref 32–36)
MCV RBC AUTO: 101 FL (ref 82–98)
MONOCYTES # BLD AUTO: 0.3 K/UL (ref 0.3–1)
MONOCYTES NFR BLD: 5.8 % (ref 4–15)
NEUTROPHILS # BLD AUTO: 4.3 K/UL (ref 1.8–7.7)
NEUTROPHILS NFR BLD: 79.6 % (ref 38–73)
NRBC BLD-RTO: 0 /100 WBC
PHOSPHATE SERPL-MCNC: 2.7 MG/DL (ref 2.7–4.5)
PLATELET # BLD AUTO: 176 K/UL (ref 150–450)
PMV BLD AUTO: 10.8 FL (ref 9.2–12.9)
POTASSIUM SERPL-SCNC: 3.9 MMOL/L (ref 3.5–5.1)
PROT SERPL-MCNC: 6.4 G/DL (ref 6–8.4)
RBC # BLD AUTO: 3.09 M/UL (ref 4–5.4)
SODIUM SERPL-SCNC: 141 MMOL/L (ref 136–145)
WBC # BLD AUTO: 5.35 K/UL (ref 3.9–12.7)

## 2021-11-09 PROCEDURE — 25000003 PHARM REV CODE 250: Performed by: NURSE PRACTITIONER

## 2021-11-09 PROCEDURE — 84100 ASSAY OF PHOSPHORUS: CPT | Performed by: NURSE PRACTITIONER

## 2021-11-09 PROCEDURE — 25000003 PHARM REV CODE 250: Performed by: STUDENT IN AN ORGANIZED HEALTH CARE EDUCATION/TRAINING PROGRAM

## 2021-11-09 PROCEDURE — 92523 SPEECH SOUND LANG COMPREHEN: CPT

## 2021-11-09 PROCEDURE — 99233 PR SUBSEQUENT HOSPITAL CARE,LEVL III: ICD-10-PCS | Mod: ,,, | Performed by: PHYSICIAN ASSISTANT

## 2021-11-09 PROCEDURE — 97530 THERAPEUTIC ACTIVITIES: CPT

## 2021-11-09 PROCEDURE — 97530 THERAPEUTIC ACTIVITIES: CPT | Mod: CQ

## 2021-11-09 PROCEDURE — 94761 N-INVAS EAR/PLS OXIMETRY MLT: CPT

## 2021-11-09 PROCEDURE — 99232 SBSQ HOSP IP/OBS MODERATE 35: CPT | Mod: GC,,, | Performed by: NEUROLOGICAL SURGERY

## 2021-11-09 PROCEDURE — 80053 COMPREHEN METABOLIC PANEL: CPT | Performed by: NURSE PRACTITIONER

## 2021-11-09 PROCEDURE — 20000000 HC ICU ROOM

## 2021-11-09 PROCEDURE — 63600175 PHARM REV CODE 636 W HCPCS: Performed by: PHYSICIAN ASSISTANT

## 2021-11-09 PROCEDURE — 97535 SELF CARE MNGMENT TRAINING: CPT

## 2021-11-09 PROCEDURE — 99233 SBSQ HOSP IP/OBS HIGH 50: CPT | Mod: ,,, | Performed by: PHYSICIAN ASSISTANT

## 2021-11-09 PROCEDURE — 25000003 PHARM REV CODE 250: Performed by: PSYCHIATRY & NEUROLOGY

## 2021-11-09 PROCEDURE — 83735 ASSAY OF MAGNESIUM: CPT | Performed by: NURSE PRACTITIONER

## 2021-11-09 PROCEDURE — 99232 PR SUBSEQUENT HOSPITAL CARE,LEVL II: ICD-10-PCS | Mod: GC,,, | Performed by: NEUROLOGICAL SURGERY

## 2021-11-09 PROCEDURE — 92526 ORAL FUNCTION THERAPY: CPT

## 2021-11-09 PROCEDURE — 97116 GAIT TRAINING THERAPY: CPT | Mod: CQ

## 2021-11-09 PROCEDURE — 85025 COMPLETE CBC W/AUTO DIFF WBC: CPT | Performed by: NURSE PRACTITIONER

## 2021-11-09 RX ORDER — CITALOPRAM 10 MG/1
10 TABLET ORAL DAILY
Status: DISCONTINUED | OUTPATIENT
Start: 2021-11-09 | End: 2021-11-09

## 2021-11-09 RX ORDER — LEVETIRACETAM 500 MG/1
500 TABLET ORAL 2 TIMES DAILY
Status: DISCONTINUED | OUTPATIENT
Start: 2021-11-09 | End: 2021-11-12 | Stop reason: HOSPADM

## 2021-11-09 RX ORDER — HEPARIN SODIUM 5000 [USP'U]/ML
5000 INJECTION, SOLUTION INTRAVENOUS; SUBCUTANEOUS EVERY 8 HOURS
Status: DISCONTINUED | OUTPATIENT
Start: 2021-11-09 | End: 2021-11-12 | Stop reason: HOSPADM

## 2021-11-09 RX ORDER — MUPIROCIN 20 MG/G
OINTMENT TOPICAL 2 TIMES DAILY
Status: DISCONTINUED | OUTPATIENT
Start: 2021-11-09 | End: 2021-11-12 | Stop reason: HOSPADM

## 2021-11-09 RX ADMIN — SENNOSIDES AND DOCUSATE SODIUM 1 TABLET: 8.6; 5 TABLET ORAL at 09:11

## 2021-11-09 RX ADMIN — MUPIROCIN: 20 OINTMENT TOPICAL at 10:11

## 2021-11-09 RX ADMIN — HEPARIN SODIUM 5000 UNITS: 5000 INJECTION INTRAVENOUS; SUBCUTANEOUS at 09:11

## 2021-11-09 RX ADMIN — HYDRALAZINE HYDROCHLORIDE 50 MG: 50 TABLET, FILM COATED ORAL at 06:11

## 2021-11-09 RX ADMIN — AMLODIPINE BESYLATE 5 MG: 5 TABLET ORAL at 10:11

## 2021-11-09 RX ADMIN — HYDRALAZINE HYDROCHLORIDE 50 MG: 50 TABLET, FILM COATED ORAL at 01:11

## 2021-11-09 RX ADMIN — HYDRALAZINE HYDROCHLORIDE 50 MG: 50 TABLET, FILM COATED ORAL at 09:11

## 2021-11-09 RX ADMIN — POLYETHYLENE GLYCOL 3350 17 G: 17 POWDER, FOR SOLUTION ORAL at 08:11

## 2021-11-09 RX ADMIN — HEPARIN SODIUM 5000 UNITS: 5000 INJECTION INTRAVENOUS; SUBCUTANEOUS at 01:11

## 2021-11-09 RX ADMIN — SENNOSIDES AND DOCUSATE SODIUM 1 TABLET: 8.6; 5 TABLET ORAL at 08:11

## 2021-11-09 RX ADMIN — LEVETIRACETAM 500 MG: 500 TABLET, FILM COATED ORAL at 09:11

## 2021-11-09 RX ADMIN — CLONIDINE HYDROCHLORIDE 0.1 MG: 0.1 TABLET ORAL at 09:11

## 2021-11-09 RX ADMIN — MUPIROCIN: 20 OINTMENT TOPICAL at 09:11

## 2021-11-09 RX ADMIN — CLONIDINE HYDROCHLORIDE 0.1 MG: 0.1 TABLET ORAL at 08:11

## 2021-11-09 RX ADMIN — CLONIDINE HYDROCHLORIDE 0.1 MG: 0.1 TABLET ORAL at 04:11

## 2021-11-09 RX ADMIN — LEVETIRACETAM 500 MG: 500 TABLET, FILM COATED ORAL at 08:11

## 2021-11-09 RX ADMIN — LOSARTAN POTASSIUM AND HYDROCHLOROTHIAZIDE 1 TABLET: 12.5; 1 TABLET ORAL at 08:11

## 2021-11-10 LAB
ALBUMIN SERPL BCP-MCNC: 3.1 G/DL (ref 3.5–5.2)
ALP SERPL-CCNC: 77 U/L (ref 55–135)
ALT SERPL W/O P-5'-P-CCNC: 11 U/L (ref 10–44)
ANION GAP SERPL CALC-SCNC: 8 MMOL/L (ref 8–16)
AST SERPL-CCNC: 13 U/L (ref 10–40)
BASOPHILS # BLD AUTO: 0.01 K/UL (ref 0–0.2)
BASOPHILS NFR BLD: 0.2 % (ref 0–1.9)
BILIRUB SERPL-MCNC: 0.5 MG/DL (ref 0.1–1)
BUN SERPL-MCNC: 20 MG/DL (ref 8–23)
CALCIUM SERPL-MCNC: 9 MG/DL (ref 8.7–10.5)
CHLORIDE SERPL-SCNC: 98 MMOL/L (ref 95–110)
CO2 SERPL-SCNC: 30 MMOL/L (ref 23–29)
CREAT SERPL-MCNC: 0.8 MG/DL (ref 0.5–1.4)
DIFFERENTIAL METHOD: ABNORMAL
EOSINOPHIL # BLD AUTO: 0 K/UL (ref 0–0.5)
EOSINOPHIL NFR BLD: 0.7 % (ref 0–8)
ERYTHROCYTE [DISTWIDTH] IN BLOOD BY AUTOMATED COUNT: 19.9 % (ref 11.5–14.5)
EST. GFR  (AFRICAN AMERICAN): >60 ML/MIN/1.73 M^2
EST. GFR  (NON AFRICAN AMERICAN): >60 ML/MIN/1.73 M^2
GLUCOSE SERPL-MCNC: 105 MG/DL (ref 70–110)
HCT VFR BLD AUTO: 31.8 % (ref 37–48.5)
HGB BLD-MCNC: 10.3 G/DL (ref 12–16)
IMM GRANULOCYTES # BLD AUTO: 0.05 K/UL (ref 0–0.04)
IMM GRANULOCYTES NFR BLD AUTO: 0.9 % (ref 0–0.5)
LYMPHOCYTES # BLD AUTO: 1.3 K/UL (ref 1–4.8)
LYMPHOCYTES NFR BLD: 22.6 % (ref 18–48)
MAGNESIUM SERPL-MCNC: 2 MG/DL (ref 1.6–2.6)
MCH RBC QN AUTO: 32.3 PG (ref 27–31)
MCHC RBC AUTO-ENTMCNC: 32.4 G/DL (ref 32–36)
MCV RBC AUTO: 100 FL (ref 82–98)
MONOCYTES # BLD AUTO: 0.6 K/UL (ref 0.3–1)
MONOCYTES NFR BLD: 11.2 % (ref 4–15)
NEUTROPHILS # BLD AUTO: 3.6 K/UL (ref 1.8–7.7)
NEUTROPHILS NFR BLD: 64.4 % (ref 38–73)
NRBC BLD-RTO: 0 /100 WBC
PHOSPHATE SERPL-MCNC: 1.9 MG/DL (ref 2.7–4.5)
PLATELET # BLD AUTO: 192 K/UL (ref 150–450)
PMV BLD AUTO: 10.9 FL (ref 9.2–12.9)
POTASSIUM SERPL-SCNC: 3.1 MMOL/L (ref 3.5–5.1)
PROT SERPL-MCNC: 6.2 G/DL (ref 6–8.4)
RBC # BLD AUTO: 3.19 M/UL (ref 4–5.4)
SODIUM SERPL-SCNC: 136 MMOL/L (ref 136–145)
WBC # BLD AUTO: 5.52 K/UL (ref 3.9–12.7)

## 2021-11-10 PROCEDURE — 25000003 PHARM REV CODE 250: Performed by: PHYSICIAN ASSISTANT

## 2021-11-10 PROCEDURE — 99222 1ST HOSP IP/OBS MODERATE 55: CPT | Mod: ,,, | Performed by: NURSE PRACTITIONER

## 2021-11-10 PROCEDURE — 63600175 PHARM REV CODE 636 W HCPCS: Performed by: PHYSICIAN ASSISTANT

## 2021-11-10 PROCEDURE — 80053 COMPREHEN METABOLIC PANEL: CPT | Performed by: NURSE PRACTITIONER

## 2021-11-10 PROCEDURE — 84100 ASSAY OF PHOSPHORUS: CPT | Performed by: NURSE PRACTITIONER

## 2021-11-10 PROCEDURE — 99232 SBSQ HOSP IP/OBS MODERATE 35: CPT | Mod: ,,, | Performed by: NEUROLOGICAL SURGERY

## 2021-11-10 PROCEDURE — 83735 ASSAY OF MAGNESIUM: CPT | Performed by: NURSE PRACTITIONER

## 2021-11-10 PROCEDURE — 85025 COMPLETE CBC W/AUTO DIFF WBC: CPT | Performed by: NURSE PRACTITIONER

## 2021-11-10 PROCEDURE — 99232 PR SUBSEQUENT HOSPITAL CARE,LEVL II: ICD-10-PCS | Mod: ,,, | Performed by: NEUROLOGICAL SURGERY

## 2021-11-10 PROCEDURE — 99222 PR INITIAL HOSPITAL CARE,LEVL II: ICD-10-PCS | Mod: ,,, | Performed by: NURSE PRACTITIONER

## 2021-11-10 PROCEDURE — 36415 COLL VENOUS BLD VENIPUNCTURE: CPT | Performed by: NURSE PRACTITIONER

## 2021-11-10 PROCEDURE — 11000001 HC ACUTE MED/SURG PRIVATE ROOM

## 2021-11-10 PROCEDURE — 92507 TX SP LANG VOICE COMM INDIV: CPT

## 2021-11-10 RX ORDER — SODIUM,POTASSIUM PHOSPHATES 280-250MG
2 POWDER IN PACKET (EA) ORAL
Status: COMPLETED | OUTPATIENT
Start: 2021-11-10 | End: 2021-11-10

## 2021-11-10 RX ORDER — FUROSEMIDE 40 MG/1
40 TABLET ORAL ONCE
Status: COMPLETED | OUTPATIENT
Start: 2021-11-10 | End: 2021-11-10

## 2021-11-10 RX ORDER — CITALOPRAM 10 MG/1
10 TABLET ORAL DAILY
Status: DISCONTINUED | OUTPATIENT
Start: 2021-11-10 | End: 2021-11-12 | Stop reason: HOSPADM

## 2021-11-10 RX ORDER — PHENYLEPHRINE HCL IN 0.9% NACL 1 MG/10 ML
SYRINGE (ML) INTRAVENOUS
Status: DISPENSED
Start: 2021-11-10 | End: 2021-11-11

## 2021-11-10 RX ADMIN — HYDRALAZINE HYDROCHLORIDE 50 MG: 50 TABLET, FILM COATED ORAL at 02:11

## 2021-11-10 RX ADMIN — HEPARIN SODIUM 5000 UNITS: 5000 INJECTION INTRAVENOUS; SUBCUTANEOUS at 09:11

## 2021-11-10 RX ADMIN — POLYETHYLENE GLYCOL 3350 17 G: 17 POWDER, FOR SOLUTION ORAL at 08:11

## 2021-11-10 RX ADMIN — ACETAMINOPHEN 650 MG: 325 TABLET ORAL at 09:11

## 2021-11-10 RX ADMIN — MUPIROCIN: 20 OINTMENT TOPICAL at 08:11

## 2021-11-10 RX ADMIN — HEPARIN SODIUM 5000 UNITS: 5000 INJECTION INTRAVENOUS; SUBCUTANEOUS at 05:11

## 2021-11-10 RX ADMIN — POTASSIUM BICARBONATE 40 MEQ: 391 TABLET, EFFERVESCENT ORAL at 09:11

## 2021-11-10 RX ADMIN — SENNOSIDES AND DOCUSATE SODIUM 1 TABLET: 8.6; 5 TABLET ORAL at 09:11

## 2021-11-10 RX ADMIN — LOSARTAN POTASSIUM AND HYDROCHLOROTHIAZIDE 1 TABLET: 12.5; 1 TABLET ORAL at 08:11

## 2021-11-10 RX ADMIN — SENNOSIDES AND DOCUSATE SODIUM 1 TABLET: 8.6; 5 TABLET ORAL at 08:11

## 2021-11-10 RX ADMIN — MUPIROCIN: 20 OINTMENT TOPICAL at 09:11

## 2021-11-10 RX ADMIN — CLONIDINE HYDROCHLORIDE 0.1 MG: 0.1 TABLET ORAL at 09:11

## 2021-11-10 RX ADMIN — POTASSIUM & SODIUM PHOSPHATES POWDER PACK 280-160-250 MG 2 PACKET: 280-160-250 PACK at 09:11

## 2021-11-10 RX ADMIN — LEVETIRACETAM 500 MG: 500 TABLET, FILM COATED ORAL at 09:11

## 2021-11-10 RX ADMIN — POTASSIUM & SODIUM PHOSPHATES POWDER PACK 280-160-250 MG 2 PACKET: 280-160-250 PACK at 04:11

## 2021-11-10 RX ADMIN — AMLODIPINE BESYLATE 5 MG: 5 TABLET ORAL at 08:11

## 2021-11-10 RX ADMIN — HEPARIN SODIUM 5000 UNITS: 5000 INJECTION INTRAVENOUS; SUBCUTANEOUS at 02:11

## 2021-11-10 RX ADMIN — CLONIDINE HYDROCHLORIDE 0.1 MG: 0.1 TABLET ORAL at 02:11

## 2021-11-10 RX ADMIN — CITALOPRAM HYDROBROMIDE 10 MG: 10 TABLET ORAL at 12:11

## 2021-11-10 RX ADMIN — LEVETIRACETAM 500 MG: 500 TABLET, FILM COATED ORAL at 08:11

## 2021-11-10 RX ADMIN — HYDRALAZINE HYDROCHLORIDE 50 MG: 50 TABLET, FILM COATED ORAL at 09:11

## 2021-11-10 RX ADMIN — HYDRALAZINE HYDROCHLORIDE 50 MG: 50 TABLET, FILM COATED ORAL at 05:11

## 2021-11-10 RX ADMIN — FUROSEMIDE 40 MG: 40 TABLET ORAL at 12:11

## 2021-11-10 RX ADMIN — CLONIDINE HYDROCHLORIDE 0.1 MG: 0.1 TABLET ORAL at 08:11

## 2021-11-11 LAB
ALBUMIN SERPL BCP-MCNC: 3.1 G/DL (ref 3.5–5.2)
ALP SERPL-CCNC: 87 U/L (ref 55–135)
ALT SERPL W/O P-5'-P-CCNC: 12 U/L (ref 10–44)
ANION GAP SERPL CALC-SCNC: 12 MMOL/L (ref 8–16)
AST SERPL-CCNC: 11 U/L (ref 10–40)
BASOPHILS # BLD AUTO: 0.03 K/UL (ref 0–0.2)
BASOPHILS NFR BLD: 0.5 % (ref 0–1.9)
BILIRUB SERPL-MCNC: 0.8 MG/DL (ref 0.1–1)
BUN SERPL-MCNC: 20 MG/DL (ref 8–23)
CALCIUM SERPL-MCNC: 9 MG/DL (ref 8.7–10.5)
CHLORIDE SERPL-SCNC: 94 MMOL/L (ref 95–110)
CO2 SERPL-SCNC: 34 MMOL/L (ref 23–29)
CREAT SERPL-MCNC: 0.7 MG/DL (ref 0.5–1.4)
DIFFERENTIAL METHOD: ABNORMAL
EOSINOPHIL # BLD AUTO: 0.2 K/UL (ref 0–0.5)
EOSINOPHIL NFR BLD: 2.8 % (ref 0–8)
ERYTHROCYTE [DISTWIDTH] IN BLOOD BY AUTOMATED COUNT: 19.9 % (ref 11.5–14.5)
EST. GFR  (AFRICAN AMERICAN): >60 ML/MIN/1.73 M^2
EST. GFR  (NON AFRICAN AMERICAN): >60 ML/MIN/1.73 M^2
GLUCOSE SERPL-MCNC: 86 MG/DL (ref 70–110)
HCT VFR BLD AUTO: 33.4 % (ref 37–48.5)
HGB BLD-MCNC: 10.9 G/DL (ref 12–16)
IMM GRANULOCYTES # BLD AUTO: 0.05 K/UL (ref 0–0.04)
IMM GRANULOCYTES NFR BLD AUTO: 0.8 % (ref 0–0.5)
LYMPHOCYTES # BLD AUTO: 1.8 K/UL (ref 1–4.8)
LYMPHOCYTES NFR BLD: 29.5 % (ref 18–48)
MAGNESIUM SERPL-MCNC: 1.7 MG/DL (ref 1.6–2.6)
MCH RBC QN AUTO: 32.2 PG (ref 27–31)
MCHC RBC AUTO-ENTMCNC: 32.6 G/DL (ref 32–36)
MCV RBC AUTO: 99 FL (ref 82–98)
MONOCYTES # BLD AUTO: 0.8 K/UL (ref 0.3–1)
MONOCYTES NFR BLD: 13.5 % (ref 4–15)
NEUTROPHILS # BLD AUTO: 3.2 K/UL (ref 1.8–7.7)
NEUTROPHILS NFR BLD: 52.9 % (ref 38–73)
NRBC BLD-RTO: 0 /100 WBC
PHOSPHATE SERPL-MCNC: 2.7 MG/DL (ref 2.7–4.5)
PLATELET # BLD AUTO: 184 K/UL (ref 150–450)
PMV BLD AUTO: 11.2 FL (ref 9.2–12.9)
POTASSIUM SERPL-SCNC: 3.5 MMOL/L (ref 3.5–5.1)
PROT SERPL-MCNC: 5.6 G/DL (ref 6–8.4)
RBC # BLD AUTO: 3.39 M/UL (ref 4–5.4)
SODIUM SERPL-SCNC: 140 MMOL/L (ref 136–145)
WBC # BLD AUTO: 6.07 K/UL (ref 3.9–12.7)

## 2021-11-11 PROCEDURE — 63600175 PHARM REV CODE 636 W HCPCS: Performed by: PHYSICIAN ASSISTANT

## 2021-11-11 PROCEDURE — 97535 SELF CARE MNGMENT TRAINING: CPT

## 2021-11-11 PROCEDURE — 99233 SBSQ HOSP IP/OBS HIGH 50: CPT | Mod: ,,, | Performed by: PHYSICIAN ASSISTANT

## 2021-11-11 PROCEDURE — 92526 ORAL FUNCTION THERAPY: CPT

## 2021-11-11 PROCEDURE — 80053 COMPREHEN METABOLIC PANEL: CPT | Performed by: PHYSICIAN ASSISTANT

## 2021-11-11 PROCEDURE — 25000003 PHARM REV CODE 250: Performed by: PHYSICIAN ASSISTANT

## 2021-11-11 PROCEDURE — 97116 GAIT TRAINING THERAPY: CPT | Mod: CQ

## 2021-11-11 PROCEDURE — 11000001 HC ACUTE MED/SURG PRIVATE ROOM

## 2021-11-11 PROCEDURE — 84100 ASSAY OF PHOSPHORUS: CPT | Performed by: PHYSICIAN ASSISTANT

## 2021-11-11 PROCEDURE — 97530 THERAPEUTIC ACTIVITIES: CPT | Mod: CQ

## 2021-11-11 PROCEDURE — 99232 SBSQ HOSP IP/OBS MODERATE 35: CPT | Mod: ,,, | Performed by: NURSE PRACTITIONER

## 2021-11-11 PROCEDURE — 99232 PR SUBSEQUENT HOSPITAL CARE,LEVL II: ICD-10-PCS | Mod: ,,, | Performed by: NURSE PRACTITIONER

## 2021-11-11 PROCEDURE — 36415 COLL VENOUS BLD VENIPUNCTURE: CPT | Performed by: PHYSICIAN ASSISTANT

## 2021-11-11 PROCEDURE — 83735 ASSAY OF MAGNESIUM: CPT | Performed by: PHYSICIAN ASSISTANT

## 2021-11-11 PROCEDURE — 85025 COMPLETE CBC W/AUTO DIFF WBC: CPT | Performed by: PHYSICIAN ASSISTANT

## 2021-11-11 PROCEDURE — 25000003 PHARM REV CODE 250: Performed by: STUDENT IN AN ORGANIZED HEALTH CARE EDUCATION/TRAINING PROGRAM

## 2021-11-11 PROCEDURE — 99233 PR SUBSEQUENT HOSPITAL CARE,LEVL III: ICD-10-PCS | Mod: ,,, | Performed by: PHYSICIAN ASSISTANT

## 2021-11-11 PROCEDURE — 92507 TX SP LANG VOICE COMM INDIV: CPT

## 2021-11-11 RX ORDER — LEVETIRACETAM 500 MG/1
500 TABLET ORAL 2 TIMES DAILY
Qty: 8 TABLET | Refills: 0 | Status: SHIPPED | OUTPATIENT
Start: 2021-11-11 | End: 2023-11-14

## 2021-11-11 RX ORDER — HYDRALAZINE HYDROCHLORIDE 50 MG/1
50 TABLET, FILM COATED ORAL 2 TIMES DAILY
Qty: 60 TABLET | Refills: 11 | Status: SHIPPED | OUTPATIENT
Start: 2021-11-11

## 2021-11-11 RX ORDER — HYDRALAZINE HYDROCHLORIDE 25 MG/1
50 TABLET, FILM COATED ORAL 2 TIMES DAILY
Status: DISCONTINUED | OUTPATIENT
Start: 2021-11-11 | End: 2021-11-12 | Stop reason: HOSPADM

## 2021-11-11 RX ORDER — HYDRALAZINE HYDROCHLORIDE 50 MG/1
50 TABLET, FILM COATED ORAL 2 TIMES DAILY
Qty: 60 TABLET | Refills: 11 | Status: SHIPPED | OUTPATIENT
Start: 2021-11-11 | End: 2021-11-11 | Stop reason: HOSPADM

## 2021-11-11 RX ADMIN — AMLODIPINE BESYLATE 5 MG: 5 TABLET ORAL at 08:11

## 2021-11-11 RX ADMIN — CLONIDINE HYDROCHLORIDE 0.1 MG: 0.1 TABLET ORAL at 08:11

## 2021-11-11 RX ADMIN — LOSARTAN POTASSIUM AND HYDROCHLOROTHIAZIDE 1 TABLET: 12.5; 1 TABLET ORAL at 08:11

## 2021-11-11 RX ADMIN — CITALOPRAM HYDROBROMIDE 10 MG: 10 TABLET ORAL at 08:11

## 2021-11-11 RX ADMIN — POLYETHYLENE GLYCOL 3350 17 G: 17 POWDER, FOR SOLUTION ORAL at 08:11

## 2021-11-11 RX ADMIN — CLONIDINE HYDROCHLORIDE 0.1 MG: 0.1 TABLET ORAL at 09:11

## 2021-11-11 RX ADMIN — HYDRALAZINE HYDROCHLORIDE 50 MG: 25 TABLET, FILM COATED ORAL at 09:11

## 2021-11-11 RX ADMIN — HYDRALAZINE HYDROCHLORIDE 50 MG: 50 TABLET, FILM COATED ORAL at 06:11

## 2021-11-11 RX ADMIN — HYDRALAZINE HYDROCHLORIDE 50 MG: 50 TABLET, FILM COATED ORAL at 01:11

## 2021-11-11 RX ADMIN — LEVETIRACETAM 500 MG: 500 TABLET, FILM COATED ORAL at 09:11

## 2021-11-11 RX ADMIN — LEVETIRACETAM 500 MG: 500 TABLET, FILM COATED ORAL at 08:11

## 2021-11-11 RX ADMIN — MUPIROCIN: 20 OINTMENT TOPICAL at 09:11

## 2021-11-11 RX ADMIN — POTASSIUM BICARBONATE 50 MEQ: 978 TABLET, EFFERVESCENT ORAL at 01:11

## 2021-11-11 RX ADMIN — HEPARIN SODIUM 5000 UNITS: 5000 INJECTION INTRAVENOUS; SUBCUTANEOUS at 09:11

## 2021-11-11 RX ADMIN — HEPARIN SODIUM 5000 UNITS: 5000 INJECTION INTRAVENOUS; SUBCUTANEOUS at 06:11

## 2021-11-11 RX ADMIN — SENNOSIDES AND DOCUSATE SODIUM 1 TABLET: 8.6; 5 TABLET ORAL at 09:11

## 2021-11-11 RX ADMIN — SENNOSIDES AND DOCUSATE SODIUM 1 TABLET: 8.6; 5 TABLET ORAL at 08:11

## 2021-11-11 RX ADMIN — HEPARIN SODIUM 5000 UNITS: 5000 INJECTION INTRAVENOUS; SUBCUTANEOUS at 01:11

## 2021-11-12 VITALS
OXYGEN SATURATION: 93 % | RESPIRATION RATE: 20 BRPM | BODY MASS INDEX: 47.2 KG/M2 | SYSTOLIC BLOOD PRESSURE: 105 MMHG | TEMPERATURE: 98 F | HEART RATE: 69 BPM | DIASTOLIC BLOOD PRESSURE: 69 MMHG | WEIGHT: 250 LBS | HEIGHT: 61 IN

## 2021-11-12 DIAGNOSIS — I62.00 NONTRAUMATIC SUBDURAL HEMORRHAGE, UNSPECIFIED: ICD-10-CM

## 2021-11-12 LAB
ALBUMIN SERPL BCP-MCNC: 3 G/DL (ref 3.5–5.2)
ALP SERPL-CCNC: 89 U/L (ref 55–135)
ALT SERPL W/O P-5'-P-CCNC: 10 U/L (ref 10–44)
ANION GAP SERPL CALC-SCNC: 8 MMOL/L (ref 8–16)
AST SERPL-CCNC: 11 U/L (ref 10–40)
BASOPHILS # BLD AUTO: 0.03 K/UL (ref 0–0.2)
BASOPHILS NFR BLD: 0.5 % (ref 0–1.9)
BILIRUB SERPL-MCNC: 1 MG/DL (ref 0.1–1)
BUN SERPL-MCNC: 19 MG/DL (ref 8–23)
CALCIUM SERPL-MCNC: 8.4 MG/DL (ref 8.7–10.5)
CHLORIDE SERPL-SCNC: 93 MMOL/L (ref 95–110)
CO2 SERPL-SCNC: 36 MMOL/L (ref 23–29)
CREAT SERPL-MCNC: 0.7 MG/DL (ref 0.5–1.4)
DIFFERENTIAL METHOD: ABNORMAL
EOSINOPHIL # BLD AUTO: 0.2 K/UL (ref 0–0.5)
EOSINOPHIL NFR BLD: 2.7 % (ref 0–8)
ERYTHROCYTE [DISTWIDTH] IN BLOOD BY AUTOMATED COUNT: 19.9 % (ref 11.5–14.5)
EST. GFR  (AFRICAN AMERICAN): >60 ML/MIN/1.73 M^2
EST. GFR  (NON AFRICAN AMERICAN): >60 ML/MIN/1.73 M^2
GLUCOSE SERPL-MCNC: 100 MG/DL (ref 70–110)
HCT VFR BLD AUTO: 34.2 % (ref 37–48.5)
HGB BLD-MCNC: 11.1 G/DL (ref 12–16)
IMM GRANULOCYTES # BLD AUTO: 0.05 K/UL (ref 0–0.04)
IMM GRANULOCYTES NFR BLD AUTO: 0.8 % (ref 0–0.5)
LYMPHOCYTES # BLD AUTO: 2 K/UL (ref 1–4.8)
LYMPHOCYTES NFR BLD: 33.5 % (ref 18–48)
MAGNESIUM SERPL-MCNC: 1.9 MG/DL (ref 1.6–2.6)
MCH RBC QN AUTO: 32 PG (ref 27–31)
MCHC RBC AUTO-ENTMCNC: 32.5 G/DL (ref 32–36)
MCV RBC AUTO: 99 FL (ref 82–98)
MONOCYTES # BLD AUTO: 0.9 K/UL (ref 0.3–1)
MONOCYTES NFR BLD: 14.2 % (ref 4–15)
NEUTROPHILS # BLD AUTO: 2.9 K/UL (ref 1.8–7.7)
NEUTROPHILS NFR BLD: 48.3 % (ref 38–73)
NRBC BLD-RTO: 0 /100 WBC
PHOSPHATE SERPL-MCNC: 3.4 MG/DL (ref 2.7–4.5)
PLATELET # BLD AUTO: 187 K/UL (ref 150–450)
PMV BLD AUTO: 10.9 FL (ref 9.2–12.9)
POCT GLUCOSE: 166 MG/DL (ref 70–110)
POTASSIUM SERPL-SCNC: 3.6 MMOL/L (ref 3.5–5.1)
PROT SERPL-MCNC: 5.7 G/DL (ref 6–8.4)
RBC # BLD AUTO: 3.47 M/UL (ref 4–5.4)
SODIUM SERPL-SCNC: 137 MMOL/L (ref 136–145)
WBC # BLD AUTO: 5.97 K/UL (ref 3.9–12.7)

## 2021-11-12 PROCEDURE — 97530 THERAPEUTIC ACTIVITIES: CPT | Mod: CQ

## 2021-11-12 PROCEDURE — 99239 HOSP IP/OBS DSCHRG MGMT >30: CPT | Mod: ,,, | Performed by: PHYSICIAN ASSISTANT

## 2021-11-12 PROCEDURE — 99239 PR HOSPITAL DISCHARGE DAY,>30 MIN: ICD-10-PCS | Mod: ,,, | Performed by: PHYSICIAN ASSISTANT

## 2021-11-12 PROCEDURE — 83735 ASSAY OF MAGNESIUM: CPT | Performed by: PHYSICIAN ASSISTANT

## 2021-11-12 PROCEDURE — 1111F PR DISCHARGE MEDS RECONCILED W/ CURRENT OUTPATIENT MED LIST: ICD-10-PCS | Mod: CPTII,,, | Performed by: PHYSICIAN ASSISTANT

## 2021-11-12 PROCEDURE — 97530 THERAPEUTIC ACTIVITIES: CPT

## 2021-11-12 PROCEDURE — 25000003 PHARM REV CODE 250: Performed by: PHYSICIAN ASSISTANT

## 2021-11-12 PROCEDURE — 97535 SELF CARE MNGMENT TRAINING: CPT

## 2021-11-12 PROCEDURE — 25000003 PHARM REV CODE 250: Performed by: STUDENT IN AN ORGANIZED HEALTH CARE EDUCATION/TRAINING PROGRAM

## 2021-11-12 PROCEDURE — 1111F DSCHRG MED/CURRENT MED MERGE: CPT | Mod: CPTII,,, | Performed by: PHYSICIAN ASSISTANT

## 2021-11-12 PROCEDURE — 36415 COLL VENOUS BLD VENIPUNCTURE: CPT | Performed by: PHYSICIAN ASSISTANT

## 2021-11-12 PROCEDURE — 97116 GAIT TRAINING THERAPY: CPT | Mod: CQ

## 2021-11-12 PROCEDURE — 63600175 PHARM REV CODE 636 W HCPCS: Performed by: PHYSICIAN ASSISTANT

## 2021-11-12 PROCEDURE — 92507 TX SP LANG VOICE COMM INDIV: CPT

## 2021-11-12 PROCEDURE — 80053 COMPREHEN METABOLIC PANEL: CPT | Performed by: PHYSICIAN ASSISTANT

## 2021-11-12 PROCEDURE — 84100 ASSAY OF PHOSPHORUS: CPT | Performed by: PHYSICIAN ASSISTANT

## 2021-11-12 PROCEDURE — 85025 COMPLETE CBC W/AUTO DIFF WBC: CPT | Performed by: PHYSICIAN ASSISTANT

## 2021-11-12 RX ADMIN — LEVETIRACETAM 500 MG: 500 TABLET, FILM COATED ORAL at 09:11

## 2021-11-12 RX ADMIN — HYDRALAZINE HYDROCHLORIDE 50 MG: 25 TABLET, FILM COATED ORAL at 09:11

## 2021-11-12 RX ADMIN — MUPIROCIN: 20 OINTMENT TOPICAL at 09:11

## 2021-11-12 RX ADMIN — CITALOPRAM HYDROBROMIDE 10 MG: 10 TABLET ORAL at 09:11

## 2021-11-12 RX ADMIN — CLONIDINE HYDROCHLORIDE 0.1 MG: 0.1 TABLET ORAL at 09:11

## 2021-11-12 RX ADMIN — AMLODIPINE BESYLATE 5 MG: 5 TABLET ORAL at 09:11

## 2021-11-12 RX ADMIN — POLYETHYLENE GLYCOL 3350 17 G: 17 POWDER, FOR SOLUTION ORAL at 09:11

## 2021-11-12 RX ADMIN — HEPARIN SODIUM 5000 UNITS: 5000 INJECTION INTRAVENOUS; SUBCUTANEOUS at 03:11

## 2021-11-12 RX ADMIN — HEPARIN SODIUM 5000 UNITS: 5000 INJECTION INTRAVENOUS; SUBCUTANEOUS at 06:11

## 2021-11-12 RX ADMIN — SENNOSIDES AND DOCUSATE SODIUM 1 TABLET: 8.6; 5 TABLET ORAL at 09:11

## 2021-11-12 RX ADMIN — ACETAMINOPHEN 650 MG: 325 TABLET ORAL at 05:11

## 2021-11-17 ENCOUNTER — TELEPHONE (OUTPATIENT)
Dept: NEUROSURGERY | Facility: CLINIC | Age: 86
End: 2021-11-17
Payer: MEDICARE

## 2021-11-19 ENCOUNTER — HOSPITAL ENCOUNTER (OUTPATIENT)
Dept: RADIOLOGY | Facility: HOSPITAL | Age: 86
Discharge: HOME | End: 2021-11-19
Attending: PHYSICIAN ASSISTANT
Payer: MEDICARE

## 2021-11-19 DIAGNOSIS — S06.5XAA SDH (SUBDURAL HEMATOMA): ICD-10-CM

## 2021-11-19 PROCEDURE — 70450 CT HEAD WITHOUT CONTRAST: ICD-10-PCS | Mod: 26,,, | Performed by: RADIOLOGY

## 2021-11-19 PROCEDURE — 70450 CT HEAD/BRAIN W/O DYE: CPT | Mod: TC

## 2021-11-19 PROCEDURE — 70450 CT HEAD/BRAIN W/O DYE: CPT | Mod: 26,,, | Performed by: RADIOLOGY

## 2021-11-22 ENCOUNTER — OFFICE VISIT (OUTPATIENT)
Dept: NEUROSURGERY | Facility: CLINIC | Age: 86
End: 2021-11-22
Payer: MEDICARE

## 2021-11-22 VITALS — SYSTOLIC BLOOD PRESSURE: 152 MMHG | HEART RATE: 78 BPM | DIASTOLIC BLOOD PRESSURE: 74 MMHG

## 2021-11-22 DIAGNOSIS — S06.5XAA SDH (SUBDURAL HEMATOMA): Primary | ICD-10-CM

## 2021-11-22 PROCEDURE — 99999 PR PBB SHADOW E&M-EST. PATIENT-LVL III: CPT | Mod: PBBFAC,,, | Performed by: NURSE PRACTITIONER

## 2021-11-22 PROCEDURE — 99999 PR PBB SHADOW E&M-EST. PATIENT-LVL III: ICD-10-PCS | Mod: PBBFAC,,, | Performed by: NURSE PRACTITIONER

## 2021-11-22 PROCEDURE — 99213 PR OFFICE/OUTPT VISIT, EST, LEVL III, 20-29 MIN: ICD-10-PCS | Mod: S$GLB,,, | Performed by: NURSE PRACTITIONER

## 2021-11-22 PROCEDURE — 99213 OFFICE O/P EST LOW 20 MIN: CPT | Mod: S$GLB,,, | Performed by: NURSE PRACTITIONER

## 2021-11-22 RX ORDER — DIAZEPAM 10 MG/1
TABLET ORAL
COMMUNITY

## 2021-11-22 RX ORDER — LOSARTAN POTASSIUM AND HYDROCHLOROTHIAZIDE 25; 100 MG/1; MG/1
1 TABLET ORAL DAILY
COMMUNITY
Start: 2021-07-14 | End: 2021-11-22 | Stop reason: SDUPTHER

## 2021-11-22 RX ORDER — PRAVASTATIN SODIUM 20 MG/1
TABLET ORAL
COMMUNITY

## 2021-11-22 RX ORDER — POTASSIUM CHLORIDE 20 MEQ/1
TABLET, EXTENDED RELEASE ORAL
COMMUNITY

## 2021-11-22 RX ORDER — CIPROFLOXACIN 500 MG/1
TABLET ORAL
COMMUNITY
End: 2021-11-22 | Stop reason: SDUPTHER

## 2021-11-22 RX ORDER — PRAVASTATIN SODIUM 10 MG/1
TABLET ORAL
COMMUNITY

## 2021-11-22 RX ORDER — EPINEPHRINE 0.22MG
AEROSOL WITH ADAPTER (ML) INHALATION
COMMUNITY

## 2021-11-22 RX ORDER — NITROFURANTOIN 25; 75 MG/1; MG/1
100 CAPSULE ORAL 2 TIMES DAILY
COMMUNITY
Start: 2021-10-14 | End: 2021-11-22 | Stop reason: SDUPTHER

## 2021-11-22 RX ORDER — PRAVASTATIN SODIUM 40 MG/1
TABLET ORAL
COMMUNITY

## 2021-11-22 RX ORDER — DIAZEPAM 5 MG/1
5 TABLET ORAL
COMMUNITY
Start: 2021-06-28 | End: 2021-11-22 | Stop reason: SDUPTHER

## 2021-11-22 RX ORDER — DIAZEPAM 5 MG/1
TABLET ORAL
COMMUNITY
Start: 2021-06-28

## 2021-11-22 RX ORDER — BACITRACIN ZINC AND POLYMYXIN B SULFATE 500; 10000 [USP'U]/G; [USP'U]/G
OINTMENT OPHTHALMIC
COMMUNITY

## 2021-11-22 RX ORDER — LOSARTAN POTASSIUM AND HYDROCHLOROTHIAZIDE 25; 100 MG/1; MG/1
TABLET ORAL
COMMUNITY
Start: 2021-07-14

## 2022-03-03 ENCOUNTER — OFFICE VISIT (OUTPATIENT)
Dept: PODIATRY | Facility: CLINIC | Age: 87
End: 2022-03-03
Payer: MEDICARE

## 2022-03-03 VITALS
SYSTOLIC BLOOD PRESSURE: 141 MMHG | HEIGHT: 61 IN | BODY MASS INDEX: 35.12 KG/M2 | TEMPERATURE: 98 F | HEART RATE: 66 BPM | WEIGHT: 186 LBS | DIASTOLIC BLOOD PRESSURE: 70 MMHG

## 2022-03-03 DIAGNOSIS — M20.11 VALGUS DEFORMITY OF BOTH GREAT TOES: ICD-10-CM

## 2022-03-03 DIAGNOSIS — M20.42 HAMMER TOE OF LEFT FOOT: ICD-10-CM

## 2022-03-03 DIAGNOSIS — M20.12 VALGUS DEFORMITY OF BOTH GREAT TOES: ICD-10-CM

## 2022-03-03 DIAGNOSIS — I73.9 PERIPHERAL VASCULAR DISEASE: ICD-10-CM

## 2022-03-03 DIAGNOSIS — L60.0 INGROWN NAIL: Primary | ICD-10-CM

## 2022-03-03 PROCEDURE — 99999 PR PBB SHADOW E&M-EST. PATIENT-LVL IV: ICD-10-PCS | Mod: PBBFAC,,, | Performed by: PODIATRIST

## 2022-03-03 PROCEDURE — 1160F PR REVIEW ALL MEDS BY PRESCRIBER/CLIN PHARMACIST DOCUMENTED: ICD-10-PCS | Mod: CPTII,S$GLB,, | Performed by: PODIATRIST

## 2022-03-03 PROCEDURE — 99202 PR OFFICE/OUTPT VISIT, NEW, LEVL II, 15-29 MIN: ICD-10-PCS | Mod: S$GLB,,, | Performed by: PODIATRIST

## 2022-03-03 PROCEDURE — 99999 PR PBB SHADOW E&M-EST. PATIENT-LVL IV: CPT | Mod: PBBFAC,,, | Performed by: PODIATRIST

## 2022-03-03 PROCEDURE — 1159F MED LIST DOCD IN RCRD: CPT | Mod: CPTII,S$GLB,, | Performed by: PODIATRIST

## 2022-03-03 PROCEDURE — 1125F AMNT PAIN NOTED PAIN PRSNT: CPT | Mod: CPTII,S$GLB,, | Performed by: PODIATRIST

## 2022-03-03 PROCEDURE — 1125F PR PAIN SEVERITY QUANTIFIED, PAIN PRESENT: ICD-10-PCS | Mod: CPTII,S$GLB,, | Performed by: PODIATRIST

## 2022-03-03 PROCEDURE — 1160F RVW MEDS BY RX/DR IN RCRD: CPT | Mod: CPTII,S$GLB,, | Performed by: PODIATRIST

## 2022-03-03 PROCEDURE — 1159F PR MEDICATION LIST DOCUMENTED IN MEDICAL RECORD: ICD-10-PCS | Mod: CPTII,S$GLB,, | Performed by: PODIATRIST

## 2022-03-03 PROCEDURE — 99202 OFFICE O/P NEW SF 15 MIN: CPT | Mod: S$GLB,,, | Performed by: PODIATRIST

## 2022-03-05 PROBLEM — I73.9 PERIPHERAL VASCULAR DISEASE: Status: ACTIVE | Noted: 2022-03-05

## 2022-03-05 PROBLEM — L60.0 INGROWN NAIL: Status: ACTIVE | Noted: 2022-03-05

## 2022-03-05 PROBLEM — M20.12 VALGUS DEFORMITY OF BOTH GREAT TOES: Status: ACTIVE | Noted: 2022-03-05

## 2022-03-05 PROBLEM — M20.42 HAMMER TOE OF LEFT FOOT: Status: ACTIVE | Noted: 2022-03-05

## 2022-03-05 PROBLEM — M20.11 VALGUS DEFORMITY OF BOTH GREAT TOES: Status: ACTIVE | Noted: 2022-03-05

## 2022-03-06 NOTE — PROGRESS NOTES
Subjective:       Patient ID: Kristin Montiel is a 89 y.o. female.    Chief Complaint: Follow-up, Callouses, and Foot Pain    Patient presents today for a new patient evaluation she was last seen in our office approximately 4 years ago.  Patient states she has been having problems with ingrown toenails on both big toes and a painful spot on the bottom of her left foot.  Past Medical History:   Diagnosis Date    Breast cancer     Emphysema lung     Hypertension      History reviewed. No pertinent surgical history.  History reviewed. No pertinent family history.  Social History     Socioeconomic History    Marital status:    Tobacco Use    Smoking status: Never Smoker    Smokeless tobacco: Never Used       Current Outpatient Medications   Medication Sig Dispense Refill    albuterol (PROVENTIL/VENTOLIN HFA) 90 mcg/actuation inhaler Inhale 1 puff into the lungs.      amLODIPine (NORVASC) 5 MG tablet amlodipine 5 mg tablet      bacitracin-polymyxin b (POLYSPORIN) ophthalmic ointment Polycin 500 unit-10,000 unit/gram eye ointment   DIANE TO OU EYELASHES QHS      capecitabine (XELODA) 500 MG Tab Take 1,500 mg by mouth.      citalopram (CELEXA) 10 MG tablet citalopram 10 mg tablet   TK 1 T PO QD      cloNIDine (CATAPRES) 0.1 MG tablet Take 0.1 mg by mouth.      coenzyme Q10 100 mg capsule coenzyme Q10 100 mg capsule      diazePAM (VALIUM) 10 MG Tab diazepam 10 mg tablet   TK 1 T PO 30 MINUTES PRIOR TO MRI      diazePAM (VALIUM) 5 MG tablet   See Instructions, 1 tab Oral 1 hour prior to scan, may repeat dose at time of scan if needed., # 3 tab, 0 Refill(s), Maintenance, Pharmacy: [facility], 154.94, cm, 06/10/21 10:55:00 CDT, Height/Length Measured, 86, kg, 06/10/21 10:55:00 CDT, We...      furosemide (LASIX ORAL) furosemide      furosemide (LASIX) 40 MG tablet   q24h, 0 Refill(s), Soft Stop      hydrALAZINE (APRESOLINE) 50 MG tablet Take 1 tablet (50 mg total) by mouth 2 (two) times a day. 60  "tablet 11    losartan-hydrochlorothiazide 100-12.5 mg (HYZAAR) 100-12.5 mg Tab losartan 100 mg-hydrochlorothiazide 12.5 mg tablet   Take 1 tablet every day by oral route.      losartan-hydrochlorothiazide 100-25 mg (HYZAAR) 100-25 mg per tablet       nitroGLYCERIN (NITROSTAT) 0.4 MG SL tablet   See Instructions, DISSOLVE 1 TABLET UNDER TONGUE EVERY 5 MINS, UP TO 3 DOSES AS NEEDED FOR CHEST PAIN, # 25 tab, 3 Refill(s), Pharmacy: [facility], 154.94, cm, 06/10/21 10:55:00 CDT, Height/Length Measured, 86, kg, 06/10/21 10:55:00...      potassium chloride SA (K-DUR,KLOR-CON) 20 MEQ tablet potassium chloride ER 20 mEq tablet,extended release(part/cryst)      pravastatin (PRAVACHOL) 10 MG tablet pravastatin 10 mg tablet      pravastatin (PRAVACHOL) 20 MG tablet pravastatin 20 mg tablet   TK 1 T PO D      pravastatin (PRAVACHOL) 40 MG tablet pravastatin 40 mg tablet      umeclidinium-vilanteroL (ANORO ELLIPTA) 62.5-25 mcg/actuation DsDv Anoro Ellipta 62.5 mcg-25 mcg/actuation powder for inhalation      levETIRAcetam (KEPPRA) 500 MG Tab Take 1 tablet (500 mg total) by mouth 2 (two) times daily for 4 days. 8 tablet 0     No current facility-administered medications for this visit.     Review of patient's allergies indicates:   Allergen Reactions    Morphine     Oxycodone-acetaminophen Itching    Sulfa (sulfonamide antibiotics)        Review of Systems   Musculoskeletal: Positive for arthralgias.   Skin: Positive for wound.   Neurological: Positive for numbness.   All other systems reviewed and are negative.      Objective:      Vitals:    03/03/22 1518   BP: (!) 141/70   Pulse: 66   Temp: 97.6 °F (36.4 °C)   Weight: 84.4 kg (186 lb)   Height: 5' 1" (1.549 m)     Physical Exam  Vitals and nursing note reviewed.   Constitutional:       Appearance: Normal appearance.   Cardiovascular:      Pulses:           Dorsalis pedis pulses are 1+ on the right side and 1+ on the left side.        Posterior " tibial pulses are 0 on the right side and 0 on the left side.   Pulmonary:      Effort: Pulmonary effort is normal.   Musculoskeletal:         General: Tenderness and deformity present.      Right foot: Deformity present.      Left foot: Deformity present.        Feet:    Feet:      Right foot:      Protective Sensation: 3 sites tested. 1 site sensed.     Toenail Condition: Right toenails are abnormally thick, long and ingrown. Fungal disease present.     Left foot:      Protective Sensation: 3 sites tested. 1 site sensed.     Skin integrity: Ulcer and skin breakdown present.      Toenail Condition: Left toenails are abnormally thick, long and ingrown. Fungal disease present.  Skin:     Capillary Refill: Capillary refill takes more than 3 seconds.      Findings: Erythema present.   Neurological:      Mental Status: She is alert.      Sensory: Sensory deficit present.   Psychiatric:         Mood and Affect: Mood normal.         Behavior: Behavior normal.         Thought Content: Thought content normal.         Judgment: Judgment normal.                  Assessment:       1. Ingrown nail    2. Peripheral vascular disease        Plan:       Patient presents today for a new patient evaluation she was last seen in our office approximately 4 years ago.  Patient states she has been having problems with ingrown toenails on both big toes and a painful spot on the bottom of her left foot.  A comprehensive new patient evaluation was performed patient does have a pre ulcerative hyperkeratotic lesion on the plantar aspect of her left forefoot I advised the patient this is directly related to the bunion and hammertoe deformities that she has was able to trim and debride the area non excisionally which the patient stated felt much better it is important that she keeps this area well moisturized and hydrated and wear shoes that give her appropriate support.  Bacitracin ointment and a light dressing was applied to the area patient  also had ingrowing nails both medial lateral border bilateral hallux I was able to aggressively trim and debride these areas patient did not require a nail avulsion.  Recommended follow-up is every 4-6 months unless the patient has any problems questions or concerns sooner.  Patient does have poor circulation which puts her at increased risk for complication as discussed.This note was created using [employer] voice recognition software that occasionally misinterpreted phrases or words.

## 2022-09-01 ENCOUNTER — OFFICE VISIT (OUTPATIENT)
Dept: PODIATRY | Facility: CLINIC | Age: 87
End: 2022-09-01
Payer: MEDICARE

## 2022-09-01 VITALS
TEMPERATURE: 98 F | WEIGHT: 185 LBS | SYSTOLIC BLOOD PRESSURE: 142 MMHG | HEART RATE: 67 BPM | HEIGHT: 61 IN | DIASTOLIC BLOOD PRESSURE: 74 MMHG | BODY MASS INDEX: 34.93 KG/M2

## 2022-09-01 DIAGNOSIS — M20.12 VALGUS DEFORMITY OF BOTH GREAT TOES: ICD-10-CM

## 2022-09-01 DIAGNOSIS — M20.42 HAMMER TOE OF LEFT FOOT: ICD-10-CM

## 2022-09-01 DIAGNOSIS — M20.11 VALGUS DEFORMITY OF BOTH GREAT TOES: ICD-10-CM

## 2022-09-01 DIAGNOSIS — L60.0 INGROWN NAIL: Primary | ICD-10-CM

## 2022-09-01 DIAGNOSIS — I73.9 PERIPHERAL VASCULAR DISEASE: ICD-10-CM

## 2022-09-01 PROCEDURE — 99999 PR PBB SHADOW E&M-EST. PATIENT-LVL IV: CPT | Mod: PBBFAC,,, | Performed by: PODIATRIST

## 2022-09-01 PROCEDURE — 99213 PR OFFICE/OUTPT VISIT, EST, LEVL III, 20-29 MIN: ICD-10-PCS | Mod: S$GLB,,, | Performed by: PODIATRIST

## 2022-09-01 PROCEDURE — 1160F PR REVIEW ALL MEDS BY PRESCRIBER/CLIN PHARMACIST DOCUMENTED: ICD-10-PCS | Mod: CPTII,S$GLB,, | Performed by: PODIATRIST

## 2022-09-01 PROCEDURE — 1159F MED LIST DOCD IN RCRD: CPT | Mod: CPTII,S$GLB,, | Performed by: PODIATRIST

## 2022-09-01 PROCEDURE — 1159F PR MEDICATION LIST DOCUMENTED IN MEDICAL RECORD: ICD-10-PCS | Mod: CPTII,S$GLB,, | Performed by: PODIATRIST

## 2022-09-01 PROCEDURE — 99999 PR PBB SHADOW E&M-EST. PATIENT-LVL IV: ICD-10-PCS | Mod: PBBFAC,,, | Performed by: PODIATRIST

## 2022-09-01 PROCEDURE — 1160F RVW MEDS BY RX/DR IN RCRD: CPT | Mod: CPTII,S$GLB,, | Performed by: PODIATRIST

## 2022-09-01 PROCEDURE — 99213 OFFICE O/P EST LOW 20 MIN: CPT | Mod: S$GLB,,, | Performed by: PODIATRIST

## 2022-09-01 RX ORDER — FUROSEMIDE 40 MG/1
TABLET ORAL
COMMUNITY
Start: 2021-12-30

## 2022-09-01 RX ORDER — FAMOTIDINE 40 MG/1
40 TABLET, FILM COATED ORAL
COMMUNITY
Start: 2021-12-30

## 2022-09-01 RX ORDER — LORATADINE 10 MG/1
10 TABLET ORAL
COMMUNITY
Start: 2022-06-23

## 2022-12-01 ENCOUNTER — OFFICE VISIT (OUTPATIENT)
Dept: PODIATRY | Facility: CLINIC | Age: 87
End: 2022-12-01
Payer: MEDICARE

## 2022-12-01 VITALS
BODY MASS INDEX: 34.93 KG/M2 | HEART RATE: 67 BPM | WEIGHT: 185 LBS | DIASTOLIC BLOOD PRESSURE: 85 MMHG | TEMPERATURE: 98 F | SYSTOLIC BLOOD PRESSURE: 188 MMHG | HEIGHT: 61 IN
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
## 2022-12-01 DIAGNOSIS — M20.42 HAMMER TOE OF LEFT FOOT: ICD-10-CM

## 2022-12-01 DIAGNOSIS — M20.12 VALGUS DEFORMITY OF BOTH GREAT TOES: ICD-10-CM

## 2022-12-01 DIAGNOSIS — L60.0 INGROWN NAIL: ICD-10-CM

## 2022-12-01 DIAGNOSIS — M20.11 VALGUS DEFORMITY OF BOTH GREAT TOES: ICD-10-CM

## 2022-12-01 DIAGNOSIS — L97.521 ULCER OF LEFT FOOT, LIMITED TO BREAKDOWN OF SKIN: Primary | ICD-10-CM

## 2022-12-01 DIAGNOSIS — R26.81 UNSTEADY GAIT: ICD-10-CM

## 2022-12-01 PROCEDURE — 99214 PR OFFICE/OUTPT VISIT, EST, LEVL IV, 30-39 MIN: ICD-10-PCS | Mod: S$GLB,,, | Performed by: PODIATRIST

## 2022-12-01 PROCEDURE — 99214 OFFICE O/P EST MOD 30 MIN: CPT | Mod: S$GLB,,, | Performed by: PODIATRIST

## 2022-12-01 PROCEDURE — 1126F AMNT PAIN NOTED NONE PRSNT: CPT | Mod: CPTII,S$GLB,, | Performed by: PODIATRIST

## 2022-12-01 PROCEDURE — 1160F PR REVIEW ALL MEDS BY PRESCRIBER/CLIN PHARMACIST DOCUMENTED: ICD-10-PCS | Mod: CPTII,S$GLB,, | Performed by: PODIATRIST

## 2022-12-01 PROCEDURE — 99999 PR PBB SHADOW E&M-EST. PATIENT-LVL IV: ICD-10-PCS | Mod: PBBFAC,,, | Performed by: PODIATRIST

## 2022-12-01 PROCEDURE — 1160F RVW MEDS BY RX/DR IN RCRD: CPT | Mod: CPTII,S$GLB,, | Performed by: PODIATRIST

## 2022-12-01 PROCEDURE — 1126F PR PAIN SEVERITY QUANTIFIED, NO PAIN PRESENT: ICD-10-PCS | Mod: CPTII,S$GLB,, | Performed by: PODIATRIST

## 2022-12-01 PROCEDURE — 1159F MED LIST DOCD IN RCRD: CPT | Mod: CPTII,S$GLB,, | Performed by: PODIATRIST

## 2022-12-01 PROCEDURE — 1159F PR MEDICATION LIST DOCUMENTED IN MEDICAL RECORD: ICD-10-PCS | Mod: CPTII,S$GLB,, | Performed by: PODIATRIST

## 2022-12-01 PROCEDURE — 99999 PR PBB SHADOW E&M-EST. PATIENT-LVL IV: CPT | Mod: PBBFAC,,, | Performed by: PODIATRIST

## 2022-12-01 NOTE — PROGRESS NOTES
Subjective:       Patient ID: Kristin Montiel is a 89 y.o. female.    Chief Complaint: Follow-up and Nail Problem      Patient states she has been having problems with ingrown toenails on both big toes and a painful spot on the bottom of her left foot.  Past Medical History:   Diagnosis Date    Breast cancer     Emphysema lung     Hypertension      History reviewed. No pertinent surgical history.  History reviewed. No pertinent family history.  Social History     Socioeconomic History    Marital status:    Tobacco Use    Smoking status: Never    Smokeless tobacco: Never       Current Outpatient Medications   Medication Sig Dispense Refill    albuterol (PROVENTIL/VENTOLIN HFA) 90 mcg/actuation inhaler Inhale 1 puff into the lungs.      amLODIPine (NORVASC) 5 MG tablet amlodipine 5 mg tablet      bacitracin-polymyxin b (POLYSPORIN) ophthalmic ointment Polycin 500 unit-10,000 unit/gram eye ointment   DIANE TO OU EYELASHES QHS      capecitabine (XELODA) 500 MG Tab Take 1,500 mg by mouth.      citalopram (CELEXA) 10 MG tablet citalopram 10 mg tablet   TK 1 T PO QD      cloNIDine (CATAPRES) 0.1 MG tablet Take 0.1 mg by mouth.      coenzyme Q10 100 mg capsule coenzyme Q10 100 mg capsule      diazePAM (VALIUM) 10 MG Tab diazepam 10 mg tablet   TK 1 T PO 30 MINUTES PRIOR TO MRI      diazePAM (VALIUM) 5 MG tablet   See Instructions, 1 tab Oral 1 hour prior to scan, may repeat dose at time of scan if needed., # 3 tab, 0 Refill(s), Maintenance, Pharmacy: [facility], 154.94, cm, 06/10/21 10:55:00 CDT, Height/Length Measured, 86, kg, 06/10/21 10:55:00 CDT, We...      famotidine (PEPCID) 40 MG tablet 40 mg.      furosemide (LASIX) 40 MG tablet   = 1 tab, Oral, Daily, PRN AS NEEDED for edema **THANK YOU**, # 30 tab, 0 Refill(s), Pharmacy: [facility], 154.94, cm, 08/12/22 11:28:00 CDT, Height/Length Measured, 84.7, kg, 08/12/22 11:28:00 CDT, Weight Dosing      hydrALAZINE (APRESOLINE) 50 MG tablet Take 1 tablet  "(50 mg total) by mouth 2 (two) times a day. 60 tablet 11    loratadine (CLARITIN) 10 mg tablet 10 mg.      losartan-hydrochlorothiazide 100-12.5 mg (HYZAAR) 100-12.5 mg Tab losartan 100 mg-hydrochlorothiazide 12.5 mg tablet   Take 1 tablet every day by oral route.      losartan-hydrochlorothiazide 100-25 mg (HYZAAR) 100-25 mg per tablet       nitroGLYCERIN (NITROSTAT) 0.4 MG SL tablet   See Instructions, DISSOLVE 1 TABLET UNDER TONGUE EVERY 5 MINS, UP TO 3 DOSES AS NEEDED FOR CHEST PAIN, # 25 tab, 3 Refill(s), Pharmacy: [facility], 154.94, cm, 06/10/21 10:55:00 CDT, Height/Length Measured, 86, kg, 06/10/21 10:55:00...      potassium chloride SA (K-DUR,KLOR-CON) 20 MEQ tablet potassium chloride ER 20 mEq tablet,extended release(part/cryst)      pravastatin (PRAVACHOL) 10 MG tablet pravastatin 10 mg tablet      pravastatin (PRAVACHOL) 20 MG tablet pravastatin 20 mg tablet   TK 1 T PO D      pravastatin (PRAVACHOL) 40 MG tablet pravastatin 40 mg tablet      umeclidinium-vilanteroL (ANORO ELLIPTA) 62.5-25 mcg/actuation DsDv Anoro Ellipta 62.5 mcg-25 mcg/actuation powder for inhalation      levETIRAcetam (KEPPRA) 500 MG Tab Take 1 tablet (500 mg total) by mouth 2 (two) times daily for 4 days. 8 tablet 0     No current facility-administered medications for this visit.     Review of patient's allergies indicates:   Allergen Reactions    Morphine     Oxycodone-acetaminophen Itching    Sulfa (sulfonamide antibiotics)        Review of Systems   Respiratory:  Negative for choking.    Musculoskeletal:  Positive for arthralgias.   Skin:  Positive for wound.   Neurological:  Positive for numbness.   All other systems reviewed and are negative.    Objective:      Vitals:    12/01/22 1109   BP: (!) 188/85   Pulse: 67   Temp: 98 °F (36.7 °C)   Weight: 83.9 kg (185 lb)   Height: 5' 1" (1.549 m)     Physical Exam  Vitals and nursing note reviewed.   Constitutional:       Appearance: Normal appearance. "   Cardiovascular:      Pulses:           Dorsalis pedis pulses are 1+ on the right side and 1+ on the left side.        Posterior tibial pulses are 0 on the right side and 0 on the left side.   Pulmonary:      Effort: Pulmonary effort is normal.   Musculoskeletal:         General: Tenderness and deformity present.      Right foot: Deformity present.      Left foot: Deformity present.        Feet:    Feet:      Right foot:      Protective Sensation: 3 sites tested.   1 site sensed.     Toenail Condition: Right toenails are abnormally thick, long and ingrown. Fungal disease present.     Left foot:      Protective Sensation: 3 sites tested.   1 site sensed.     Skin integrity: Ulcer and skin breakdown present.      Toenail Condition: Left toenails are abnormally thick, long and ingrown. Fungal disease present.  Skin:     Capillary Refill: Capillary refill takes more than 3 seconds.      Findings: Erythema present.   Neurological:      Mental Status: She is alert.      Sensory: Sensory deficit present.   Psychiatric:         Mood and Affect: Mood normal.         Behavior: Behavior normal.         Thought Content: Thought content normal.         Judgment: Judgment normal.                Assessment:       1. Ulcer of left foot, limited to breakdown of skin    2. 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 Hammer toe of left foot    4. Ingrown nail    5. Fall, initial encounter    6. Unsteady gait        Plan:        Patient states she has been having problems with ingrown toenails on both big toes and a painful spot on the bottom of her left foot.  A comprehensive new patient evaluation was performed patient does have a pre ulcerative hyperkeratotic lesion on the plantar aspect of her left forefoot I advised the patient this is directly related to the bunion and hammertoe deformities that she has was able to trim and debride the area non excisionally which the patient stated felt much better it is important that  she keeps this area well moisturized and hydrated and wear shoes that give her appropriate support.  Bacitracin ointment and a light dressing was applied to the area patient also had ingrowing nails both medial lateral border bilateral hallux I was able to aggressively trim and debride these areas patient did not require a nail avulsion.  Recommended follow-up is every 3-4 months unless the patient has any problems questions or concerns sooner.  I have recommended the application of Vicks vapor rub to the toenails twice a day every day advising the patient typically over 4-6 months she will see improvement in the fungal infection in the nail.  Patient does have poor circulation which puts her at increased risk for complication as discussed.  Patient relates she is had some unsteadiness she is fallen recently about 2 weeks ago she is been having some swelling some discomfort on the inside of her left foot and ankle we did dispense the patient a compression sleeve to start to wear to give her better support and to control the swelling I want her to wear this daily at all times of weight-bearing.  This note was created using [employer] voice recognition software that occasionally misinterpreted phrases or words.

## 2023-03-07 ENCOUNTER — OFFICE VISIT (OUTPATIENT)
Dept: PODIATRY | Facility: CLINIC | Age: 88
End: 2023-03-07
Payer: MEDICARE

## 2023-03-07 VITALS
WEIGHT: 184.94 LBS | BODY MASS INDEX: 34.92 KG/M2 | HEART RATE: 77 BPM | HEIGHT: 61 IN | DIASTOLIC BLOOD PRESSURE: 82 MMHG | SYSTOLIC BLOOD PRESSURE: 147 MMHG

## 2023-03-07 DIAGNOSIS — M20.12 VALGUS DEFORMITY OF BOTH GREAT TOES: ICD-10-CM

## 2023-03-07 DIAGNOSIS — L60.0 INGROWN NAIL: Primary | ICD-10-CM

## 2023-03-07 DIAGNOSIS — M20.11 VALGUS DEFORMITY OF BOTH GREAT TOES: ICD-10-CM

## 2023-03-07 DIAGNOSIS — I73.9 PERIPHERAL VASCULAR DISEASE: ICD-10-CM

## 2023-03-07 DIAGNOSIS — L97.521 ULCER OF LEFT FOOT, LIMITED TO BREAKDOWN OF SKIN: ICD-10-CM

## 2023-03-07 DIAGNOSIS — R26.81 UNSTEADY GAIT: ICD-10-CM

## 2023-03-07 PROCEDURE — 1125F AMNT PAIN NOTED PAIN PRSNT: CPT | Mod: CPTII,S$GLB,, | Performed by: PODIATRIST

## 2023-03-07 PROCEDURE — 1125F PR PAIN SEVERITY QUANTIFIED, PAIN PRESENT: ICD-10-PCS | Mod: CPTII,S$GLB,, | Performed by: PODIATRIST

## 2023-03-07 PROCEDURE — 1160F RVW MEDS BY RX/DR IN RCRD: CPT | Mod: CPTII,S$GLB,, | Performed by: PODIATRIST

## 2023-03-07 PROCEDURE — 3288F PR FALLS RISK ASSESSMENT DOCUMENTED: ICD-10-PCS | Mod: CPTII,S$GLB,, | Performed by: PODIATRIST

## 2023-03-07 PROCEDURE — 99999 PR PBB SHADOW E&M-EST. PATIENT-LVL V: ICD-10-PCS | Mod: PBBFAC,,, | Performed by: PODIATRIST

## 2023-03-07 PROCEDURE — 1100F PTFALLS ASSESS-DOCD GE2>/YR: CPT | Mod: CPTII,S$GLB,, | Performed by: PODIATRIST

## 2023-03-07 PROCEDURE — 1159F PR MEDICATION LIST DOCUMENTED IN MEDICAL RECORD: ICD-10-PCS | Mod: CPTII,S$GLB,, | Performed by: PODIATRIST

## 2023-03-07 PROCEDURE — 1160F PR REVIEW ALL MEDS BY PRESCRIBER/CLIN PHARMACIST DOCUMENTED: ICD-10-PCS | Mod: CPTII,S$GLB,, | Performed by: PODIATRIST

## 2023-03-07 PROCEDURE — 99213 PR OFFICE/OUTPT VISIT, EST, LEVL III, 20-29 MIN: ICD-10-PCS | Mod: S$GLB,,, | Performed by: PODIATRIST

## 2023-03-07 PROCEDURE — 3288F FALL RISK ASSESSMENT DOCD: CPT | Mod: CPTII,S$GLB,, | Performed by: PODIATRIST

## 2023-03-07 PROCEDURE — 99213 OFFICE O/P EST LOW 20 MIN: CPT | Mod: S$GLB,,, | Performed by: PODIATRIST

## 2023-03-07 PROCEDURE — 1100F PR PT FALLS ASSESS DOC 2+ FALLS/FALL W/INJURY/YR: ICD-10-PCS | Mod: CPTII,S$GLB,, | Performed by: PODIATRIST

## 2023-03-07 PROCEDURE — 1159F MED LIST DOCD IN RCRD: CPT | Mod: CPTII,S$GLB,, | Performed by: PODIATRIST

## 2023-03-07 PROCEDURE — 99999 PR PBB SHADOW E&M-EST. PATIENT-LVL V: CPT | Mod: PBBFAC,,, | Performed by: PODIATRIST

## 2023-03-07 RX ORDER — FERROUS SULFATE 325(65) MG
TABLET, DELAYED RELEASE (ENTERIC COATED) ORAL
COMMUNITY
Start: 2022-10-24

## 2023-03-07 RX ORDER — POTASSIUM BICARBONATE 782 MG/1
20 TABLET, EFFERVESCENT ORAL 2 TIMES DAILY
COMMUNITY
Start: 2022-11-28

## 2023-03-07 RX ORDER — DOXYCYCLINE 100 MG/1
100 CAPSULE ORAL 2 TIMES DAILY
COMMUNITY
Start: 2022-10-13 | End: 2023-09-09

## 2023-03-07 NOTE — PROGRESS NOTES
Subjective:       Patient ID: Kristin Montiel is a 89 y.o. female.    Chief Complaint: Ingrown Toenail        Patient states she has been having problems with ingrown toenails on both big toes and a painful spot on the bottom of her left foot.  Past Medical History:   Diagnosis Date    Breast cancer     Emphysema lung     Hypertension      History reviewed. No pertinent surgical history.  History reviewed. No pertinent family history.  Social History     Socioeconomic History    Marital status:    Tobacco Use    Smoking status: Never    Smokeless tobacco: Never       Current Outpatient Medications   Medication Sig Dispense Refill    amLODIPine (NORVASC) 5 MG tablet amlodipine 5 mg tablet      citalopram (CELEXA) 10 MG tablet citalopram 10 mg tablet   TK 1 T PO QD      cloNIDine (CATAPRES) 0.1 MG tablet Take 0.1 mg by mouth.      diazePAM (VALIUM) 10 MG Tab diazepam 10 mg tablet   TK 1 T PO 30 MINUTES PRIOR TO MRI      EFFER-K disintegrating tablet Take 20 mEq by mouth 2 (two) times daily.      famotidine (PEPCID) 40 MG tablet 40 mg.      ferrous sulfate 325 (65 FE) MG EC tablet Take by mouth.      furosemide (LASIX) 40 MG tablet   = 1 tab, Oral, Daily, PRN AS NEEDED for edema **THANK YOU**, # 30 tab, 0 Refill(s), Pharmacy: [facility], 154.94, cm, 08/12/22 11:28:00 CDT, Height/Length Measured, 84.7, kg, 08/12/22 11:28:00 CDT, Weight Dosing      hydrALAZINE (APRESOLINE) 50 MG tablet Take 1 tablet (50 mg total) by mouth 2 (two) times a day. 60 tablet 11    loratadine (CLARITIN) 10 mg tablet 10 mg.      losartan-hydrochlorothiazide 100-12.5 mg (HYZAAR) 100-12.5 mg Tab losartan 100 mg-hydrochlorothiazide 12.5 mg tablet   Take 1 tablet every day by oral route.      nitroGLYCERIN (NITROSTAT) 0.4 MG SL tablet   See Instructions, DISSOLVE 1 TABLET UNDER TONGUE EVERY 5 MINS, UP TO 3 DOSES AS NEEDED FOR CHEST PAIN, # 25 tab, 3 Refill(s), Pharmacy: [facility], 154.94, cm, 06/10/21 10:55:00  CDT, Height/Length Measured, 86, kg, 06/10/21 10:55:00...      potassium chloride SA (K-DUR,KLOR-CON) 20 MEQ tablet potassium chloride ER 20 mEq tablet,extended release(part/cryst)      albuterol (PROVENTIL/VENTOLIN HFA) 90 mcg/actuation inhaler Inhale 1 puff into the lungs.      bacitracin-polymyxin b (POLYSPORIN) ophthalmic ointment Polycin 500 unit-10,000 unit/gram eye ointment   DIANE TO OU EYELASHES QHS      capecitabine (XELODA) 500 MG Tab Take 1,500 mg by mouth.      coenzyme Q10 100 mg capsule coenzyme Q10 100 mg capsule      diazePAM (VALIUM) 5 MG tablet   See Instructions, 1 tab Oral 1 hour prior to scan, may repeat dose at time of scan if needed., # 3 tab, 0 Refill(s), Maintenance, Pharmacy: [facility] [place], 154.94, cm, 06/10/21 10:55:00 CDT, Height/Length Measured, 86, kg, 06/10/21 10:55:00 CDT, We...      doxycycline (VIBRAMYCIN) 100 MG Cap Take 100 mg by mouth 2 (two) times daily.      levETIRAcetam (KEPPRA) 500 MG Tab Take 1 tablet (500 mg total) by mouth 2 (two) times daily for 4 days. 8 tablet 0    losartan-hydrochlorothiazide 100-25 mg (HYZAAR) 100-25 mg per tablet       pravastatin (PRAVACHOL) 10 MG tablet pravastatin 10 mg tablet      pravastatin (PRAVACHOL) 20 MG tablet pravastatin 20 mg tablet   TK 1 T PO D      pravastatin (PRAVACHOL) 40 MG tablet pravastatin 40 mg tablet      umeclidinium-vilanteroL (ANORO ELLIPTA) 62.5-25 mcg/actuation DsDv Anoro Ellipta 62.5 mcg-25 mcg/actuation powder for inhalation       No current facility-administered medications for this visit.     Review of patient's allergies indicates:   Allergen Reactions    Morphine     Oxycodone-acetaminophen Itching    Sulfa (sulfonamide antibiotics)        Review of Systems   Respiratory:  Negative for choking.    Musculoskeletal:  Positive for arthralgias.   Skin:  Positive for wound.   Neurological:  Positive for numbness.   All other systems reviewed and are negative.    Objective:      Vitals:    03/07/23 1111   BP: (!)  "147/82   Pulse: 77   Weight: 83.9 kg (184 lb 15.5 oz)   Height: 5' 1" (1.549 m)     Physical Exam  Vitals and nursing note reviewed.   Constitutional:       Appearance: Normal appearance.   Cardiovascular:      Pulses:           Dorsalis pedis pulses are 1+ on the right side and 1+ on the left side.        Posterior tibial pulses are 0 on the right side and 0 on the left side.   Pulmonary:      Effort: Pulmonary effort is normal.   Musculoskeletal:         General: Tenderness and deformity present.      Right foot: Deformity present.      Left foot: Deformity present.        Feet:    Feet:      Right foot:      Protective Sensation: 3 sites tested.   1 site sensed.     Toenail Condition: Right toenails are abnormally thick, long and ingrown. Fungal disease present.     Left foot:      Protective Sensation: 3 sites tested.   1 site sensed.     Skin integrity: Ulcer and skin breakdown present.      Toenail Condition: Left toenails are abnormally thick, long and ingrown. Fungal disease present.  Skin:     Capillary Refill: Capillary refill takes more than 3 seconds.      Findings: Erythema present.   Neurological:      Mental Status: She is alert.      Sensory: Sensory deficit present.   Psychiatric:         Mood and Affect: Mood normal.         Behavior: Behavior normal.         Thought Content: Thought content normal.         Judgment: Judgment normal.                Assessment:       1. Ingrown nail    2. Peripheral vascular disease    3. Ulcer of left foot, limited to breakdown of skin    4. 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 Unsteady gait        Plan:        Patient states she has been having problems with ingrown toenails on both big toes and a painful spot on the bottom of her left foot.  A comprehensive new patient evaluation was performed patient does have a pre ulcerative hyperkeratotic lesion on the plantar aspect of her left forefoot I advised the patient this is directly related to the " bunion and hammertoe deformities that she has was able to trim and debride the area non excisionally which the patient stated felt much better it is important that she keeps this area well moisturized and hydrated and wear shoes that give her appropriate support.  Bacitracin ointment and a light dressing was applied to the area patient also had ingrowing nails both medial lateral border bilateral hallux I was able to aggressively trim and debride these areas patient did not require a nail avulsion.  Recommended follow-up is every 3-4 months unless the patient has any problems questions or concerns sooner.  I have recommended the application of Vicks vapor rub to the toenails twice a day every day advising the patient typically over 4-6 months she will see improvement in the fungal infection in the nail.  Patient does have poor circulation which puts her at increased risk for complication as discussed.  Patient relates she is had some unsteadiness she is fallen recently about 2 weeks ago she is been having some swelling some discomfort on the inside of her left foot and ankle we did dispense the patient a compression sleeve to start to wear to give her better support and to control the swelling I want her to wear this daily at all times of weight-bearing.  This note was created using [employer] voice recognition software that occasionally misinterpreted phrases or words.

## 2023-06-06 ENCOUNTER — OFFICE VISIT (OUTPATIENT)
Dept: PODIATRY | Facility: CLINIC | Age: 88
End: 2023-06-06
Payer: MEDICARE

## 2023-06-06 VITALS
BODY MASS INDEX: 34.74 KG/M2 | DIASTOLIC BLOOD PRESSURE: 77 MMHG | SYSTOLIC BLOOD PRESSURE: 148 MMHG | HEART RATE: 82 BPM | WEIGHT: 184 LBS | HEIGHT: 61 IN

## 2023-06-06 DIAGNOSIS — I73.9 PERIPHERAL VASCULAR DISEASE: ICD-10-CM

## 2023-06-06 DIAGNOSIS — R26.81 UNSTEADY GAIT: ICD-10-CM

## 2023-06-06 DIAGNOSIS — L60.0 INGROWN NAIL: Primary | ICD-10-CM

## 2023-06-06 DIAGNOSIS — M20.42 HAMMER TOE OF LEFT FOOT: ICD-10-CM

## 2023-06-06 DIAGNOSIS — L97.521 ULCER OF LEFT FOOT, LIMITED TO BREAKDOWN OF SKIN: ICD-10-CM

## 2023-06-06 PROCEDURE — 1160F PR REVIEW ALL MEDS BY PRESCRIBER/CLIN PHARMACIST DOCUMENTED: ICD-10-PCS | Mod: CPTII,S$GLB,, | Performed by: PODIATRIST

## 2023-06-06 PROCEDURE — 1159F MED LIST DOCD IN RCRD: CPT | Mod: CPTII,S$GLB,, | Performed by: PODIATRIST

## 2023-06-06 PROCEDURE — 3288F FALL RISK ASSESSMENT DOCD: CPT | Mod: CPTII,S$GLB,, | Performed by: PODIATRIST

## 2023-06-06 PROCEDURE — 99213 PR OFFICE/OUTPT VISIT, EST, LEVL III, 20-29 MIN: ICD-10-PCS | Mod: S$GLB,,, | Performed by: PODIATRIST

## 2023-06-06 PROCEDURE — 99213 OFFICE O/P EST LOW 20 MIN: CPT | Mod: S$GLB,,, | Performed by: PODIATRIST

## 2023-06-06 PROCEDURE — 99999 PR PBB SHADOW E&M-EST. PATIENT-LVL V: ICD-10-PCS | Mod: PBBFAC,,, | Performed by: PODIATRIST

## 2023-06-06 PROCEDURE — 1101F PT FALLS ASSESS-DOCD LE1/YR: CPT | Mod: CPTII,S$GLB,, | Performed by: PODIATRIST

## 2023-06-06 PROCEDURE — 1125F PR PAIN SEVERITY QUANTIFIED, PAIN PRESENT: ICD-10-PCS | Mod: CPTII,S$GLB,, | Performed by: PODIATRIST

## 2023-06-06 PROCEDURE — 1159F PR MEDICATION LIST DOCUMENTED IN MEDICAL RECORD: ICD-10-PCS | Mod: CPTII,S$GLB,, | Performed by: PODIATRIST

## 2023-06-06 PROCEDURE — 1125F AMNT PAIN NOTED PAIN PRSNT: CPT | Mod: CPTII,S$GLB,, | Performed by: PODIATRIST

## 2023-06-06 PROCEDURE — 1101F PR PT FALLS ASSESS DOC 0-1 FALLS W/OUT INJ PAST YR: ICD-10-PCS | Mod: CPTII,S$GLB,, | Performed by: PODIATRIST

## 2023-06-06 PROCEDURE — 1160F RVW MEDS BY RX/DR IN RCRD: CPT | Mod: CPTII,S$GLB,, | Performed by: PODIATRIST

## 2023-06-06 PROCEDURE — 3288F PR FALLS RISK ASSESSMENT DOCUMENTED: ICD-10-PCS | Mod: CPTII,S$GLB,, | Performed by: PODIATRIST

## 2023-06-06 PROCEDURE — 99999 PR PBB SHADOW E&M-EST. PATIENT-LVL V: CPT | Mod: PBBFAC,,, | Performed by: PODIATRIST

## 2023-06-06 NOTE — PROGRESS NOTES
Subjective:       Patient ID: Kristin Montiel is a 89 y.o. female.    Chief Complaint: Toe Pain and Callouses        Patient states she has been having problems with ingrown toenails on both big toes and a painful spot on the bottom of her left foot.  Past Medical History:   Diagnosis Date    Breast cancer     Emphysema lung     Hypertension      History reviewed. No pertinent surgical history.  History reviewed. No pertinent family history.  Social History     Socioeconomic History    Marital status:    Tobacco Use    Smoking status: Never    Smokeless tobacco: Never       Current Outpatient Medications   Medication Sig Dispense Refill    albuterol (PROVENTIL/VENTOLIN HFA) 90 mcg/actuation inhaler Inhale 1 puff into the lungs.      amLODIPine (NORVASC) 5 MG tablet amlodipine 5 mg tablet      bacitracin-polymyxin b (POLYSPORIN) ophthalmic ointment Polycin 500 unit-10,000 unit/gram eye ointment   DIANE TO OU EYELASHES QHS      capecitabine (XELODA) 500 MG Tab Take 1,500 mg by mouth.      citalopram (CELEXA) 10 MG tablet citalopram 10 mg tablet   TK 1 T PO QD      cloNIDine (CATAPRES) 0.1 MG tablet Take 0.1 mg by mouth.      coenzyme Q10 100 mg capsule coenzyme Q10 100 mg capsule      diazePAM (VALIUM) 10 MG Tab diazepam 10 mg tablet   TK 1 T PO 30 MINUTES PRIOR TO MRI      diazePAM (VALIUM) 5 MG tablet   See Instructions, 1 tab Oral 1 hour prior to scan, may repeat dose at time of scan if needed., # 3 tab, 0 Refill(s), Maintenance, Pharmacy: [facility], 154.94, cm, 06/10/21 10:55:00 CDT, Height/Length Measured, 86, kg, 06/10/21 10:55:00 CDT, We...      doxycycline (VIBRAMYCIN) 100 MG Cap Take 100 mg by mouth 2 (two) times daily.      EFFER-K disintegrating tablet Take 20 mEq by mouth 2 (two) times daily.      famotidine (PEPCID) 40 MG tablet 40 mg.      ferrous sulfate 325 (65 FE) MG EC tablet Take by mouth.      furosemide (LASIX) 40 MG tablet   = 1 tab, Oral, Daily, PRN AS NEEDED for edema **THANK  YOU**, # 30 tab, 0 Refill(s), Pharmacy: [facility], 154.94, cm, 08/12/22 11:28:00 CDT, Height/Length Measured, 84.7, kg, 08/12/22 11:28:00 CDT, Weight Dosing      hydrALAZINE (APRESOLINE) 50 MG tablet Take 1 tablet (50 mg total) by mouth 2 (two) times a day. 60 tablet 11    levETIRAcetam (KEPPRA) 500 MG Tab Take 1 tablet (500 mg total) by mouth 2 (two) times daily for 4 days. 8 tablet 0    loratadine (CLARITIN) 10 mg tablet 10 mg.      losartan-hydrochlorothiazide 100-12.5 mg (HYZAAR) 100-12.5 mg Tab losartan 100 mg-hydrochlorothiazide 12.5 mg tablet   Take 1 tablet every day by oral route.      losartan-hydrochlorothiazide 100-25 mg (HYZAAR) 100-25 mg per tablet       nitroGLYCERIN (NITROSTAT) 0.4 MG SL tablet   See Instructions, DISSOLVE 1 TABLET UNDER TONGUE EVERY 5 MINS, UP TO 3 DOSES AS NEEDED FOR CHEST PAIN, # 25 tab, 3 Refill(s), Pharmacy: [facility], 154.94, cm, 06/10/21 10:55:00 CDT, Height/Length Measured, 86, kg, 06/10/21 10:55:00...      potassium chloride SA (K-DUR,KLOR-CON) 20 MEQ tablet potassium chloride ER 20 mEq tablet,extended release(part/cryst)      pravastatin (PRAVACHOL) 10 MG tablet pravastatin 10 mg tablet      pravastatin (PRAVACHOL) 20 MG tablet pravastatin 20 mg tablet   TK 1 T PO D      pravastatin (PRAVACHOL) 40 MG tablet pravastatin 40 mg tablet      umeclidinium-vilanteroL (ANORO ELLIPTA) 62.5-25 mcg/actuation DsDv Anoro Ellipta 62.5 mcg-25 mcg/actuation powder for inhalation       No current facility-administered medications for this visit.     Review of patient's allergies indicates:   Allergen Reactions    Morphine     Oxycodone-acetaminophen Itching    Sulfa (sulfonamide antibiotics)        Review of Systems   Respiratory:  Negative for choking.    Musculoskeletal:  Positive for arthralgias.   Skin:  Positive for wound.   Neurological:  Positive for numbness.   All other systems reviewed and are negative.    Objective:      Vitals:    06/06/23 1126   BP:  "(!) 148/77   Pulse: 82   Weight: 83.5 kg (184 lb)   Height: 5' 1" (1.549 m)     Physical Exam  Vitals and nursing note reviewed.   Constitutional:       Appearance: Normal appearance.   Cardiovascular:      Pulses:           Dorsalis pedis pulses are 1+ on the right side and 1+ on the left side.        Posterior tibial pulses are 0 on the right side and 0 on the left side.   Pulmonary:      Effort: Pulmonary effort is normal.   Musculoskeletal:         General: Tenderness and deformity present.      Right foot: Deformity present.      Left foot: Deformity present.        Feet:    Feet:      Right foot:      Protective Sensation: 3 sites tested.   1 site sensed.     Toenail Condition: Right toenails are abnormally thick, long and ingrown. Fungal disease present.     Left foot:      Protective Sensation: 3 sites tested.   1 site sensed.     Skin integrity: Ulcer and skin breakdown present.      Toenail Condition: Left toenails are abnormally thick, long and ingrown. Fungal disease present.  Skin:     Capillary Refill: Capillary refill takes more than 3 seconds.      Findings: Erythema present.   Neurological:      Mental Status: She is alert.      Sensory: Sensory deficit present.   Psychiatric:         Mood and Affect: Mood normal.         Behavior: Behavior normal.         Thought Content: Thought content normal.         Judgment: Judgment normal.                Assessment:       1. Ingrown nail    2. Peripheral vascular disease    3. Ulcer of left foot, limited to breakdown of skin    4. Unsteady gait    5. Hammer toe of left foot        Plan:        Patient states she has been having problems with ingrown toenails on both big toes and a painful spot on the bottom of her left foot.  A comprehensive new patient evaluation was performed patient does have a pre ulcerative hyperkeratotic lesion on the plantar aspect of her left forefoot I advised the patient this is directly related to the bunion and hammertoe " deformities that she has was able to trim and debride the area non excisionally which the patient stated felt much better it is important that she keeps this area well moisturized and hydrated and wear shoes that give her appropriate support.  Bacitracin ointment and a light dressing was applied to the area patient also had ingrowing nails both medial lateral border bilateral hallux I was able to aggressively trim and debride these areas patient did not require a nail avulsion.  Recommended follow-up is every 3-4 months unless the patient has any problems questions or concerns sooner.  I have recommended the application of Vicks vapor rub to the toenails twice a day every day advising the patient typically over 4-6 months she will see improvement in the fungal infection in the nail.  Patient does have poor circulation which puts her at increased risk for complication as discussed.  Patient relates she is had some unsteadiness she is fallen recently about 2 weeks ago she is been having some swelling some discomfort on the inside of her left foot and ankle we did dispense the patient a compression sleeve to start to wear to give her better support and to control the swelling I want her to wear this daily at all times of weight-bearing.  This note was created using [employer] voice recognition software that occasionally misinterpreted phrases or words.

## 2023-09-05 ENCOUNTER — OFFICE VISIT (OUTPATIENT)
Dept: PODIATRY | Facility: CLINIC | Age: 88
End: 2023-09-05
Payer: MEDICARE

## 2023-09-05 VITALS
WEIGHT: 184 LBS | BODY MASS INDEX: 34.74 KG/M2 | HEIGHT: 61 IN | DIASTOLIC BLOOD PRESSURE: 60 MMHG | HEART RATE: 67 BPM | SYSTOLIC BLOOD PRESSURE: 138 MMHG

## 2023-09-05 DIAGNOSIS — M20.12 VALGUS DEFORMITY OF BOTH GREAT TOES: ICD-10-CM

## 2023-09-05 DIAGNOSIS — L60.0 INGROWN NAIL: Primary | ICD-10-CM

## 2023-09-05 DIAGNOSIS — M20.11 VALGUS DEFORMITY OF BOTH GREAT TOES: ICD-10-CM

## 2023-09-05 DIAGNOSIS — L97.521 ULCER OF LEFT FOOT, LIMITED TO BREAKDOWN OF SKIN: ICD-10-CM

## 2023-09-05 DIAGNOSIS — R26.81 UNSTEADY GAIT: ICD-10-CM

## 2023-09-05 PROCEDURE — 3288F FALL RISK ASSESSMENT DOCD: CPT | Mod: CPTII,S$GLB,, | Performed by: PODIATRIST

## 2023-09-05 PROCEDURE — 1159F PR MEDICATION LIST DOCUMENTED IN MEDICAL RECORD: ICD-10-PCS | Mod: CPTII,S$GLB,, | Performed by: PODIATRIST

## 2023-09-05 PROCEDURE — 1159F MED LIST DOCD IN RCRD: CPT | Mod: CPTII,S$GLB,, | Performed by: PODIATRIST

## 2023-09-05 PROCEDURE — 1125F PR PAIN SEVERITY QUANTIFIED, PAIN PRESENT: ICD-10-PCS | Mod: CPTII,S$GLB,, | Performed by: PODIATRIST

## 2023-09-05 PROCEDURE — 1160F RVW MEDS BY RX/DR IN RCRD: CPT | Mod: CPTII,S$GLB,, | Performed by: PODIATRIST

## 2023-09-05 PROCEDURE — 1101F PT FALLS ASSESS-DOCD LE1/YR: CPT | Mod: CPTII,S$GLB,, | Performed by: PODIATRIST

## 2023-09-05 PROCEDURE — 1101F PR PT FALLS ASSESS DOC 0-1 FALLS W/OUT INJ PAST YR: ICD-10-PCS | Mod: CPTII,S$GLB,, | Performed by: PODIATRIST

## 2023-09-05 PROCEDURE — 99213 OFFICE O/P EST LOW 20 MIN: CPT | Mod: S$GLB,,, | Performed by: PODIATRIST

## 2023-09-05 PROCEDURE — 1160F PR REVIEW ALL MEDS BY PRESCRIBER/CLIN PHARMACIST DOCUMENTED: ICD-10-PCS | Mod: CPTII,S$GLB,, | Performed by: PODIATRIST

## 2023-09-05 PROCEDURE — 99999 PR PBB SHADOW E&M-EST. PATIENT-LVL V: ICD-10-PCS | Mod: PBBFAC,,, | Performed by: PODIATRIST

## 2023-09-05 PROCEDURE — 1125F AMNT PAIN NOTED PAIN PRSNT: CPT | Mod: CPTII,S$GLB,, | Performed by: PODIATRIST

## 2023-09-05 PROCEDURE — 99999 PR PBB SHADOW E&M-EST. PATIENT-LVL V: CPT | Mod: PBBFAC,,, | Performed by: PODIATRIST

## 2023-09-05 PROCEDURE — 99213 PR OFFICE/OUTPT VISIT, EST, LEVL III, 20-29 MIN: ICD-10-PCS | Mod: S$GLB,,, | Performed by: PODIATRIST

## 2023-09-05 PROCEDURE — 3288F PR FALLS RISK ASSESSMENT DOCUMENTED: ICD-10-PCS | Mod: CPTII,S$GLB,, | Performed by: PODIATRIST

## 2023-09-07 ENCOUNTER — HOSPITAL ENCOUNTER (EMERGENCY)
Facility: HOSPITAL | Age: 88
Discharge: HOME | End: 2023-09-07
Attending: EMERGENCY MEDICINE
Payer: MEDICARE

## 2023-09-07 VITALS
TEMPERATURE: 98 F | SYSTOLIC BLOOD PRESSURE: 137 MMHG | HEART RATE: 80 BPM | BODY MASS INDEX: 36.29 KG/M2 | OXYGEN SATURATION: 98 % | DIASTOLIC BLOOD PRESSURE: 68 MMHG | RESPIRATION RATE: 10 BRPM | HEIGHT: 59 IN | WEIGHT: 180 LBS

## 2023-09-07 DIAGNOSIS — R55 VASOVAGAL SYNCOPE: ICD-10-CM

## 2023-09-07 DIAGNOSIS — K56.41 FECAL IMPACTION IN RECTUM: Primary | ICD-10-CM

## 2023-09-07 DIAGNOSIS — R55 SYNCOPE: ICD-10-CM

## 2023-09-07 LAB
ALBUMIN SERPL BCP-MCNC: 3.7 G/DL (ref 3.5–5.2)
ALP SERPL-CCNC: 101 U/L (ref 55–135)
ALT SERPL W/O P-5'-P-CCNC: 9 U/L (ref 10–44)
ANION GAP SERPL CALC-SCNC: 13 MMOL/L (ref 8–16)
AST SERPL-CCNC: 13 U/L (ref 10–40)
BASOPHILS # BLD AUTO: 0.03 K/UL (ref 0–0.2)
BASOPHILS NFR BLD: 0.4 % (ref 0–1.9)
BILIRUB SERPL-MCNC: 0.7 MG/DL (ref 0.1–1)
BUN SERPL-MCNC: 33 MG/DL (ref 10–30)
CALCIUM SERPL-MCNC: 9.8 MG/DL (ref 8.7–10.5)
CHLORIDE SERPL-SCNC: 101 MMOL/L (ref 95–110)
CO2 SERPL-SCNC: 25 MMOL/L (ref 23–29)
CREAT SERPL-MCNC: 1.1 MG/DL (ref 0.5–1.4)
DIFFERENTIAL METHOD: ABNORMAL
EOSINOPHIL # BLD AUTO: 0 K/UL (ref 0–0.5)
EOSINOPHIL NFR BLD: 0.6 % (ref 0–8)
ERYTHROCYTE [DISTWIDTH] IN BLOOD BY AUTOMATED COUNT: 15.6 % (ref 11.5–14.5)
EST. GFR  (NO RACE VARIABLE): 47.4 ML/MIN/1.73 M^2
GLUCOSE SERPL-MCNC: 137 MG/DL (ref 70–110)
HCT VFR BLD AUTO: 34.5 % (ref 37–48.5)
HGB BLD-MCNC: 11.7 G/DL (ref 12–16)
IMM GRANULOCYTES # BLD AUTO: 0.03 K/UL (ref 0–0.04)
IMM GRANULOCYTES NFR BLD AUTO: 0.4 % (ref 0–0.5)
LYMPHOCYTES # BLD AUTO: 1.4 K/UL (ref 1–4.8)
LYMPHOCYTES NFR BLD: 20.1 % (ref 18–48)
MAGNESIUM SERPL-MCNC: 1.9 MG/DL (ref 1.6–2.6)
MCH RBC QN AUTO: 33.4 PG (ref 27–31)
MCHC RBC AUTO-ENTMCNC: 33.9 G/DL (ref 32–36)
MCV RBC AUTO: 99 FL (ref 82–98)
MONOCYTES # BLD AUTO: 0.6 K/UL (ref 0.3–1)
MONOCYTES NFR BLD: 8.7 % (ref 4–15)
NEUTROPHILS # BLD AUTO: 4.8 K/UL (ref 1.8–7.7)
NEUTROPHILS NFR BLD: 69.8 % (ref 38–73)
NRBC BLD-RTO: 0 /100 WBC
PLATELET # BLD AUTO: 203 K/UL (ref 150–450)
PMV BLD AUTO: 9.8 FL (ref 9.2–12.9)
POTASSIUM SERPL-SCNC: 4 MMOL/L (ref 3.5–5.1)
PROT SERPL-MCNC: 6.9 G/DL (ref 6–8.4)
RBC # BLD AUTO: 3.5 M/UL (ref 4–5.4)
SODIUM SERPL-SCNC: 139 MMOL/L (ref 136–145)
WBC # BLD AUTO: 6.9 K/UL (ref 3.9–12.7)

## 2023-09-07 PROCEDURE — 74176 CT ABDOMEN PELVIS WITHOUT CONTRAST: ICD-10-PCS | Mod: 26,,, | Performed by: RADIOLOGY

## 2023-09-07 PROCEDURE — 85025 COMPLETE CBC W/AUTO DIFF WBC: CPT | Performed by: EMERGENCY MEDICINE

## 2023-09-07 PROCEDURE — 80053 COMPREHEN METABOLIC PANEL: CPT | Performed by: EMERGENCY MEDICINE

## 2023-09-07 PROCEDURE — 93005 ELECTROCARDIOGRAM TRACING: CPT

## 2023-09-07 PROCEDURE — 83735 ASSAY OF MAGNESIUM: CPT | Performed by: EMERGENCY MEDICINE

## 2023-09-07 PROCEDURE — 74176 CT ABD & PELVIS W/O CONTRAST: CPT | Mod: 26,,, | Performed by: RADIOLOGY

## 2023-09-07 PROCEDURE — 99285 EMERGENCY DEPT VISIT HI MDM: CPT | Mod: 25

## 2023-09-07 PROCEDURE — 74176 CT ABD & PELVIS W/O CONTRAST: CPT | Mod: TC

## 2023-09-07 PROCEDURE — 93010 EKG 12-LEAD: ICD-10-PCS | Mod: ,,, | Performed by: INTERNAL MEDICINE

## 2023-09-07 PROCEDURE — 93010 ELECTROCARDIOGRAM REPORT: CPT | Mod: ,,, | Performed by: INTERNAL MEDICINE

## 2023-09-08 NOTE — ED PROVIDER NOTES
Encounter Date: 9/7/2023       History     Chief Complaint   Patient presents with    Constipation     Pt c/o constipation since Sunday. Pt took imodium on Sunday d/t diarrhea. No BM since then.     Pt here with c/o abd pain with constipation since Sunday. Pt says she had diarrhea a few days ago and she took immodium and no she cannot go. She says she was trying to have a BM and passed out while straining pta. She has no CP or SOB. No NV. No urinary sxs.     The history is provided by the patient and the EMS personnel.   Constipation   The current episode started several days ago. The problem has been unchanged. The stool is described as hard. Associated symptoms include abdominal pain, diarrhea and rectal pain. Pertinent negatives include no anorexia, no fever, no hematemesis, no hemorrhoids, no nausea, no vomiting, no hematuria, no vaginal bleeding, no vaginal discharge, no chest pain, no headaches, no coughing, no difficulty breathing and no rash.     Review of patient's allergies indicates:   Allergen Reactions    Caffeine Swelling    Morphine     Oxycodone-acetaminophen Itching    Sulfa (sulfonamide antibiotics)      Past Medical History:   Diagnosis Date    Breast cancer     Emphysema lung     Hypertension      No past surgical history on file.  No family history on file.  Social History     Tobacco Use    Smoking status: Never    Smokeless tobacco: Never     Review of Systems   Constitutional:  Negative for fever.   Respiratory:  Negative for cough.    Cardiovascular:  Negative for chest pain.   Gastrointestinal:  Positive for abdominal pain, constipation, diarrhea and rectal pain. Negative for anorexia, hematemesis, hemorrhoids, nausea and vomiting.   Genitourinary:  Negative for hematuria, vaginal bleeding and vaginal discharge.   Skin:  Negative for rash.   Neurological:  Negative for headaches.   All other systems reviewed and are negative.      Physical Exam     Initial Vitals [09/07/23 1957]   BP Pulse  Resp Temp SpO2   137/68 64 18 97.9 °F (36.6 °C) 98 %      MAP       --         Physical Exam    Nursing note and vitals reviewed.  Constitutional: She appears well-developed and well-nourished. She is not diaphoretic. No distress.   uncomfortable   HENT:   Mouth/Throat: Oropharynx is clear and moist.   Eyes: No scleral icterus.   Cardiovascular:  Normal rate, regular rhythm, normal heart sounds and intact distal pulses.           Pulmonary/Chest: Breath sounds normal.   Abdominal: Abdomen is soft. Bowel sounds are normal. She exhibits no distension and no mass. There is abdominal tenderness. There is guarding. There is no rebound.   Musculoskeletal:         General: No tenderness or edema. Normal range of motion.     Neurological: She is alert and oriented to person, place, and time. GCS score is 15. GCS eye subscore is 4. GCS verbal subscore is 5. GCS motor subscore is 6.   Skin: Skin is warm and dry. Capillary refill takes less than 2 seconds. No rash noted. No erythema. No pallor.   Psychiatric: She has a normal mood and affect.         ED Course   Procedures  Labs Reviewed   CBC W/ AUTO DIFFERENTIAL - Abnormal; Notable for the following components:       Result Value    RBC 3.50 (*)     Hemoglobin 11.7 (*)     Hematocrit 34.5 (*)     MCV 99 (*)     MCH 33.4 (*)     RDW 15.6 (*)     All other components within normal limits   COMPREHENSIVE METABOLIC PANEL - Abnormal; Notable for the following components:    Glucose 137 (*)     BUN 33 (*)     ALT 9 (*)     eGFR 47.4 (*)     All other components within normal limits   MAGNESIUM   URINALYSIS, REFLEX TO URINE CULTURE     EKG Readings: (Independently Interpreted)   Rhythm: Sinus Bradycardia. Heart Rate: 59. Ectopy: No Ectopy. Conduction: 1st Degree AV Block and LBBB. ST Segments: Normal ST Segments. T Waves: Normal. Axis: Left Axis Deviation. Clinical Impression: Sinus Bradycardia with LBBB       Imaging Results              CT Abdomen Pelvis  Without Contrast (In  process)                      Medications - No data to display  Medical Decision Making  Pt presented with fecal impaction and vasovagal event while straining on toilet. Benign exam here. Chronic changes on EKG. CBC, CMP, Mg and UA unremarkable. CT abd nap other than fecal impaction. She was given SSEs and had large BM and felt better. Pt advised to f/u with her PCP next week.     Amount and/or Complexity of Data Reviewed  Labs: ordered. Decision-making details documented in ED Course.  Radiology: ordered. Decision-making details documented in ED Course.               ED Course as of 09/07/23 2227   Thu Sep 07, 2023   2130 CT neg for acute process. Fecal impaction per [facility] [DC]      ED Course User Index  [DC] Magdy Jj Jr., MD                    Clinical Impression:   Final diagnoses:  [R55] Syncope  [K56.41] Fecal impaction in rectum (Primary)  [R55] Vasovagal syncope        ED Disposition Condition    Discharge Stable          ED Prescriptions    None       Follow-up Information       Follow up With Specialties Details Why Contact Info    Anthony Barragan MD Hospitalist, Family Medicine, Internal Medicine, Cardiology Schedule an appointment as soon as possible for a visit   [address]  [facility] [place] [address] [phone]               Magdy Jj Jr., MD  09/07/23 2226

## 2023-09-09 NOTE — PROGRESS NOTES
Subjective:       Patient ID: Kristin Montiel is a 89 y.o. female.    Chief Complaint: Ingrown Toenail        Patient states she has been having problems with ingrown toenails on both big toes and a painful spot on the bottom of her left foot.  Past Medical History:   Diagnosis Date    Breast cancer     Emphysema lung     Hypertension      History reviewed. No pertinent surgical history.  History reviewed. No pertinent family history.  Social History     Socioeconomic History    Marital status:    Tobacco Use    Smoking status: Never    Smokeless tobacco: Never       Current Outpatient Medications   Medication Sig Dispense Refill    albuterol (PROVENTIL/VENTOLIN HFA) 90 mcg/actuation inhaler Inhale 1 puff into the lungs.      amLODIPine (NORVASC) 5 MG tablet amlodipine 5 mg tablet      bacitracin-polymyxin b (POLYSPORIN) ophthalmic ointment Polycin 500 unit-10,000 unit/gram eye ointment   DIANE TO OU EYELASHES QHS      capecitabine (XELODA) 500 MG Tab Take 1,500 mg by mouth.      citalopram (CELEXA) 10 MG tablet citalopram 10 mg tablet   TK 1 T PO QD      cloNIDine (CATAPRES) 0.1 MG tablet Take 0.1 mg by mouth.      coenzyme Q10 100 mg capsule coenzyme Q10 100 mg capsule      diazePAM (VALIUM) 10 MG Tab diazepam 10 mg tablet   TK 1 T PO 30 MINUTES PRIOR TO MRI      diazePAM (VALIUM) 5 MG tablet   See Instructions, 1 tab Oral 1 hour prior to scan, may repeat dose at time of scan if needed., # 3 tab, 0 Refill(s), Maintenance, Pharmacy: [facility], [place], 154.94, cm, 06/10/21 10:55:00 CDT, Height/Length Measured, 86, kg, 06/10/21 10:55:00 CDT, We...      EFFER-K disintegrating tablet Take 20 mEq by mouth 2 (two) times daily.      famotidine (PEPCID) 40 MG tablet 40 mg.      ferrous sulfate 325 (65 FE) MG EC tablet Take by mouth.      hydrALAZINE (APRESOLINE) 50 MG tablet Take 1 tablet (50 mg total) by mouth 2 (two) times a day. 60 tablet 11    levETIRAcetam (KEPPRA) 500 MG Tab Take 1 tablet (500 mg total) by  mouth 2 (two) times daily for 4 days. 8 tablet 0    loratadine (CLARITIN) 10 mg tablet 10 mg.      losartan-hydrochlorothiazide 100-12.5 mg (HYZAAR) 100-12.5 mg Tab losartan 100 mg-hydrochlorothiazide 12.5 mg tablet   Take 1 tablet every day by oral route.      losartan-hydrochlorothiazide 100-25 mg (HYZAAR) 100-25 mg per tablet       nitroGLYCERIN (NITROSTAT) 0.4 MG SL tablet   See Instructions, DISSOLVE 1 TABLET UNDER TONGUE EVERY 5 MINS, UP TO 3 DOSES AS NEEDED FOR CHEST PAIN, # 25 tab, 3 Refill(s), Pharmacy: [facility], 154.94, cm, 06/10/21 10:55:00 CDT, Height/Length Measured, 86, kg, 06/10/21 10:55:00...      potassium chloride SA (K-DUR,KLOR-CON) 20 MEQ tablet potassium chloride ER 20 mEq tablet,extended release(part/cryst)      pravastatin (PRAVACHOL) 10 MG tablet pravastatin 10 mg tablet      pravastatin (PRAVACHOL) 20 MG tablet pravastatin 20 mg tablet   TK 1 T PO D      pravastatin (PRAVACHOL) 40 MG tablet pravastatin 40 mg tablet      umeclidinium-vilanteroL (ANORO ELLIPTA) 62.5-25 mcg/actuation DsDv Anoro Ellipta 62.5 mcg-25 mcg/actuation powder for inhalation      furosemide (LASIX) 40 MG tablet   = 1 tab, Oral, Daily, PRN AS NEEDED for edema **THANK YOU**, # 30 tab, 0 Refill(s), Pharmacy: [facility], 154.94, cm, 08/12/22 11:28:00 CDT, Height/Length Measured, 84.7, kg, 08/12/22 11:28:00 CDT, Weight Dosing       No current facility-administered medications for this visit.     Review of patient's allergies indicates:   Allergen Reactions    Caffeine Swelling    Morphine     Oxycodone-acetaminophen Itching    Sulfa (sulfonamide antibiotics)        Review of Systems   Respiratory:  Negative for choking.    Musculoskeletal:  Positive for arthralgias.   Skin:  Positive for wound.   Neurological:  Positive for numbness.   All other systems reviewed and are negative.      Objective:      Vitals:    09/05/23 1124   BP: 138/60   BP Location: Left arm   Patient Position: Sitting   Pulse:  "67   Weight: 83.5 kg (184 lb)   Height: 5' 1" (1.549 m)     Physical Exam  Vitals and nursing note reviewed.   Constitutional:       Appearance: Normal appearance.   Cardiovascular:      Pulses:           Dorsalis pedis pulses are 1+ on the right side and 1+ on the left side.        Posterior tibial pulses are 0 on the right side and 0 on the left side.   Pulmonary:      Effort: Pulmonary effort is normal.   Musculoskeletal:         General: Tenderness and deformity present.      Right foot: Deformity present.      Left foot: Deformity present.        Feet:    Feet:      Right foot:      Protective Sensation: 3 sites tested.   1 site sensed.     Toenail Condition: Right toenails are abnormally thick, long and ingrown. Fungal disease present.     Left foot:      Protective Sensation: 3 sites tested.   1 site sensed.     Skin integrity: Ulcer and skin breakdown present.      Toenail Condition: Left toenails are abnormally thick, long and ingrown. Fungal disease present.  Skin:     Capillary Refill: Capillary refill takes more than 3 seconds.      Findings: Erythema present.   Neurological:      Mental Status: She is alert.      Sensory: Sensory deficit present.   Psychiatric:         Mood and Affect: Mood normal.         Behavior: Behavior normal.         Thought Content: Thought content normal.         Judgment: Judgment normal.                                    Assessment:       1. Ingrown nail    2. Ulcer of left foot, limited to breakdown of skin    3. 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 Unsteady gait        Plan:        Patient states she has been having problems with ingrown toenails on both big toes and a painful spot on the bottom of her left foot.  A comprehensive new patient evaluation was performed patient does have a pre ulcerative hyperkeratotic lesion on the plantar aspect of her left forefoot I advised the patient this is directly related to the bunion and hammertoe deformities that " she has was able to trim and debride the area non excisionally which the patient stated felt much better it is important that she keeps this area well moisturized and hydrated and wear shoes that give her appropriate support.  Bacitracin ointment and a light dressing was applied to the area patient also had ingrowing nails both medial lateral border bilateral hallux I was able to aggressively trim and debride these areas patient did not require a nail avulsion.  Recommended follow-up is every 3-4 months unless the patient has any problems questions or concerns sooner.  I have recommended the application of Vicks vapor rub to the toenails twice a day every day advising the patient typically over 4-6 months she will see improvement in the fungal infection in the nail.  Patient does have poor circulation which puts her at increased risk for complication as discussed.   This note was created using [employer] voice recognition software that occasionally misinterpreted phrases or words.

## 2023-11-14 ENCOUNTER — OFFICE VISIT (OUTPATIENT)
Dept: PODIATRY | Facility: CLINIC | Age: 88
End: 2023-11-14
Payer: MEDICARE

## 2023-11-14 VITALS
SYSTOLIC BLOOD PRESSURE: 145 MMHG | DIASTOLIC BLOOD PRESSURE: 77 MMHG | WEIGHT: 180 LBS | BODY MASS INDEX: 36.29 KG/M2 | HEART RATE: 64 BPM | HEIGHT: 59 IN

## 2023-11-14 DIAGNOSIS — M20.42 HAMMER TOE OF LEFT FOOT: ICD-10-CM

## 2023-11-14 DIAGNOSIS — L97.521 ULCER OF LEFT FOOT, LIMITED TO BREAKDOWN OF SKIN: ICD-10-CM

## 2023-11-14 DIAGNOSIS — I73.9 PERIPHERAL VASCULAR DISEASE: ICD-10-CM

## 2023-11-14 DIAGNOSIS — L60.0 INGROWN NAIL: Primary | ICD-10-CM

## 2023-11-14 PROCEDURE — 1125F PR PAIN SEVERITY QUANTIFIED, PAIN PRESENT: ICD-10-PCS | Mod: CPTII,S$GLB,, | Performed by: PODIATRIST

## 2023-11-14 PROCEDURE — 1101F PR PT FALLS ASSESS DOC 0-1 FALLS W/OUT INJ PAST YR: ICD-10-PCS | Mod: CPTII,S$GLB,, | Performed by: PODIATRIST

## 2023-11-14 PROCEDURE — 3288F PR FALLS RISK ASSESSMENT DOCUMENTED: ICD-10-PCS | Mod: CPTII,S$GLB,, | Performed by: PODIATRIST

## 2023-11-14 PROCEDURE — 1101F PT FALLS ASSESS-DOCD LE1/YR: CPT | Mod: CPTII,S$GLB,, | Performed by: PODIATRIST

## 2023-11-14 PROCEDURE — 1160F PR REVIEW ALL MEDS BY PRESCRIBER/CLIN PHARMACIST DOCUMENTED: ICD-10-PCS | Mod: CPTII,S$GLB,, | Performed by: PODIATRIST

## 2023-11-14 PROCEDURE — 1125F AMNT PAIN NOTED PAIN PRSNT: CPT | Mod: CPTII,S$GLB,, | Performed by: PODIATRIST

## 2023-11-14 PROCEDURE — 1160F RVW MEDS BY RX/DR IN RCRD: CPT | Mod: CPTII,S$GLB,, | Performed by: PODIATRIST

## 2023-11-14 PROCEDURE — 99999 PR PBB SHADOW E&M-EST. PATIENT-LVL V: ICD-10-PCS | Mod: PBBFAC,,, | Performed by: PODIATRIST

## 2023-11-14 PROCEDURE — 99213 OFFICE O/P EST LOW 20 MIN: CPT | Mod: S$GLB,,, | Performed by: PODIATRIST

## 2023-11-14 PROCEDURE — 3288F FALL RISK ASSESSMENT DOCD: CPT | Mod: CPTII,S$GLB,, | Performed by: PODIATRIST

## 2023-11-14 PROCEDURE — 1159F PR MEDICATION LIST DOCUMENTED IN MEDICAL RECORD: ICD-10-PCS | Mod: CPTII,S$GLB,, | Performed by: PODIATRIST

## 2023-11-14 PROCEDURE — 99999 PR PBB SHADOW E&M-EST. PATIENT-LVL V: CPT | Mod: PBBFAC,,, | Performed by: PODIATRIST

## 2023-11-14 PROCEDURE — 1159F MED LIST DOCD IN RCRD: CPT | Mod: CPTII,S$GLB,, | Performed by: PODIATRIST

## 2023-11-14 PROCEDURE — 99213 PR OFFICE/OUTPT VISIT, EST, LEVL III, 20-29 MIN: ICD-10-PCS | Mod: S$GLB,,, | Performed by: PODIATRIST

## 2023-11-16 NOTE — PROGRESS NOTES
Subjective:       Patient ID: Kristin Montiel is a 89 y.o. female.    Chief Complaint: Ingrown Toenail and Callouses        Patient states she has been having problems with ingrown toenails on both big toes and a painful spot on the bottom of her left foot.  Past Medical History:   Diagnosis Date    Breast cancer     Emphysema lung     Hypertension      History reviewed. No pertinent surgical history.  History reviewed. No pertinent family history.  Social History     Socioeconomic History    Marital status:    Tobacco Use    Smoking status: Never    Smokeless tobacco: Never       Current Outpatient Medications   Medication Sig Dispense Refill    albuterol (PROVENTIL/VENTOLIN HFA) 90 mcg/actuation inhaler Inhale 1 puff into the lungs.      amLODIPine (NORVASC) 5 MG tablet amlodipine 5 mg tablet      bacitracin-polymyxin b (POLYSPORIN) ophthalmic ointment Polycin 500 unit-10,000 unit/gram eye ointment   DIANE TO OU EYELASHES QHS      capecitabine (XELODA) 500 MG Tab Take 1,500 mg by mouth.      citalopram (CELEXA) 10 MG tablet citalopram 10 mg tablet   TK 1 T PO QD      cloNIDine (CATAPRES) 0.1 MG tablet Take 0.1 mg by mouth.      coenzyme Q10 100 mg capsule coenzyme Q10 100 mg capsule      diazePAM (VALIUM) 10 MG Tab diazepam 10 mg tablet   TK 1 T PO 30 MINUTES PRIOR TO MRI      diazePAM (VALIUM) 5 MG tablet   See Instructions, 1 tab Oral 1 hour prior to scan, may repeat dose at time of scan if needed., # 3 tab, 0 Refill(s), Maintenance, Pharmacy: [facility], 154.94, cm, 06/10/21 10:55:00 CDT, Height/Length Measured, 86, kg, 06/10/21 10:55:00 CDT, We...      EFFER-K disintegrating tablet Take 20 mEq by mouth 2 (two) times daily.      famotidine (PEPCID) 40 MG tablet 40 mg.      ferrous sulfate 325 (65 FE) MG EC tablet Take by mouth.      furosemide (LASIX) 40 MG tablet   = 1 tab, Oral, Daily, PRN AS NEEDED for edema **THANK YOU**, # 30 tab, 0 Refill(s), Pharmacy: [facility], 154.94, cm, 08/12/22  11:28:00 CDT, Height/Length Measured, 84.7, kg, 08/12/22 11:28:00 CDT, Weight Dosing      hydrALAZINE (APRESOLINE) 50 MG tablet Take 1 tablet (50 mg total) by mouth 2 (two) times a day. 60 tablet 11    levETIRAcetam (KEPPRA) 500 MG Tab Take 1 tablet (500 mg total) by mouth 2 (two) times daily for 4 days. 8 tablet 0    loratadine (CLARITIN) 10 mg tablet 10 mg.      losartan-hydrochlorothiazide 100-12.5 mg (HYZAAR) 100-12.5 mg Tab losartan 100 mg-hydrochlorothiazide 12.5 mg tablet   Take 1 tablet every day by oral route.      losartan-hydrochlorothiazide 100-25 mg (HYZAAR) 100-25 mg per tablet       nitroGLYCERIN (NITROSTAT) 0.4 MG SL tablet   See Instructions, DISSOLVE 1 TABLET UNDER TONGUE EVERY 5 MINS, UP TO 3 DOSES AS NEEDED FOR CHEST PAIN, # 25 tab, 3 Refill(s), Pharmacy: [facility], 154.94, cm, 06/10/21 10:55:00 CDT, Height/Length Measured, 86, kg, 06/10/21 10:55:00...      potassium chloride SA (K-DUR,KLOR-CON) 20 MEQ tablet potassium chloride ER 20 mEq tablet,extended release(part/cryst)      pravastatin (PRAVACHOL) 10 MG tablet pravastatin 10 mg tablet      pravastatin (PRAVACHOL) 20 MG tablet pravastatin 20 mg tablet   TK 1 T PO D      pravastatin (PRAVACHOL) 40 MG tablet pravastatin 40 mg tablet      umeclidinium-vilanteroL (ANORO ELLIPTA) 62.5-25 mcg/actuation DsDv Anoro Ellipta 62.5 mcg-25 mcg/actuation powder for inhalation       No current facility-administered medications for this visit.     Review of patient's allergies indicates:   Allergen Reactions    Caffeine Swelling    Morphine     Oxycodone-acetaminophen Itching    Sulfa (sulfonamide antibiotics)        Review of Systems   Respiratory:  Negative for choking.    Musculoskeletal:  Positive for arthralgias.   Skin:  Positive for wound.   Neurological:  Positive for numbness.   All other systems reviewed and are negative.      Objective:      Vitals:    11/14/23 1127   BP: (!) 145/77   BP Location: Left arm   Patient Position:  "Sitting   Pulse: 64   Weight: 81.6 kg (180 lb)   Height: 4' 11" (1.499 m)     Physical Exam  Vitals and nursing note reviewed.   Constitutional:       Appearance: Normal appearance.   Cardiovascular:      Pulses:           Dorsalis pedis pulses are 1+ on the right side and 1+ on the left side.        Posterior tibial pulses are 0 on the right side and 0 on the left side.   Pulmonary:      Effort: Pulmonary effort is normal.   Musculoskeletal:         General: Tenderness and deformity present.      Right foot: Deformity present.      Left foot: Deformity present.        Feet:    Feet:      Right foot:      Protective Sensation: 3 sites tested.   1 site sensed.     Toenail Condition: Right toenails are abnormally thick, long and ingrown. Fungal disease present.     Left foot:      Protective Sensation: 3 sites tested.   1 site sensed.     Skin integrity: Ulcer and skin breakdown present.      Toenail Condition: Left toenails are abnormally thick, long and ingrown. Fungal disease present.  Skin:     Capillary Refill: Capillary refill takes more than 3 seconds.      Findings: Erythema present.   Neurological:      Mental Status: She is alert.      Sensory: Sensory deficit present.   Psychiatric:         Mood and Affect: Mood normal.         Behavior: Behavior normal.         Thought Content: Thought content normal.         Judgment: Judgment normal.                                                      Assessment:       1. Ingrown nail    2. Ulcer of left foot, limited to breakdown of skin    3. Hammer toe of left foot    4. Peripheral vascular disease        Plan:        Patient states she has been having problems with ingrown toenails on both big toes and a painful spot on the bottom of her left foot.  A comprehensive new patient evaluation was performed patient does have a pre ulcerative hyperkeratotic lesion on the plantar aspect of her left forefoot I advised the patient this is directly related to the bunion and " hammertoe deformities that she has was able to trim and debride the area non excisionally which the patient stated felt much better it is important that she keeps this area well moisturized and hydrated and wear shoes that give her appropriate support.  Bacitracin ointment and a light dressing was applied to the area patient also had ingrowing nails both medial lateral border bilateral hallux I was able to aggressively trim and debride these areas patient did not require a nail avulsion.  Recommended follow-up is every 3-4 months unless the patient has any problems questions or concerns sooner.  I have recommended the application of Vicks vapor rub to the toenails twice a day every day advising the patient typically over 4-6 months she will see improvement in the fungal infection in the nail.  Patient does have poor circulation which puts her at increased risk for complication as discussed.   This note was created using M*[employer] voice recognition software that occasionally misinterpreted phrases or words.

## 2024-02-20 ENCOUNTER — OFFICE VISIT (OUTPATIENT)
Dept: PODIATRY | Facility: CLINIC | Age: 89
End: 2024-02-20
Payer: MEDICARE

## 2024-02-20 VITALS
OXYGEN SATURATION: 95 % | WEIGHT: 180 LBS | SYSTOLIC BLOOD PRESSURE: 141 MMHG | HEIGHT: 59 IN | BODY MASS INDEX: 36.29 KG/M2 | HEART RATE: 78 BPM | DIASTOLIC BLOOD PRESSURE: 78 MMHG

## 2024-02-20 DIAGNOSIS — I73.9 PERIPHERAL VASCULAR DISEASE: ICD-10-CM

## 2024-02-20 DIAGNOSIS — L97.521 ULCER OF LEFT FOOT, LIMITED TO BREAKDOWN OF SKIN: ICD-10-CM

## 2024-02-20 DIAGNOSIS — M20.12 VALGUS DEFORMITY OF BOTH GREAT TOES: ICD-10-CM

## 2024-02-20 DIAGNOSIS — L60.0 INGROWN NAIL: Primary | ICD-10-CM

## 2024-02-20 DIAGNOSIS — M20.11 VALGUS DEFORMITY OF BOTH GREAT TOES: ICD-10-CM

## 2024-02-20 DIAGNOSIS — M20.42 HAMMER TOE OF LEFT FOOT: ICD-10-CM

## 2024-02-20 PROCEDURE — 99999 PR PBB SHADOW E&M-EST. PATIENT-LVL V: CPT | Mod: PBBFAC,,, | Performed by: PODIATRIST

## 2024-02-20 PROCEDURE — 99213 OFFICE O/P EST LOW 20 MIN: CPT | Mod: S$GLB,,, | Performed by: PODIATRIST

## 2024-02-20 RX ORDER — FUROSEMIDE 20 MG/1
20 TABLET ORAL
COMMUNITY
Start: 2022-11-30

## 2024-02-20 RX ORDER — LOSARTAN POTASSIUM AND HYDROCHLOROTHIAZIDE 12.5; 1 MG/1; MG/1
TABLET ORAL
COMMUNITY
Start: 2023-12-28 | End: 2024-12-22

## 2024-02-20 NOTE — PROGRESS NOTES
Subjective:       Patient ID: Kristin Montiel is a 89 y.o. female.    Chief Complaint: Foot Pain        Patient states she has been having problems with ingrown toenails on both big toes and a painful spot on the bottom of her left foot.  Past Medical History:   Diagnosis Date    Breast cancer     Emphysema lung     Hypertension      History reviewed. No pertinent surgical history.  History reviewed. No pertinent family history.  Social History     Socioeconomic History    Marital status:    Tobacco Use    Smoking status: Never    Smokeless tobacco: Never       Current Outpatient Medications   Medication Sig Dispense Refill    albuterol (PROVENTIL/VENTOLIN HFA) 90 mcg/actuation inhaler Inhale 1 puff into the lungs.      amLODIPine (NORVASC) 5 MG tablet amlodipine 5 mg tablet      bacitracin-polymyxin b (POLYSPORIN) ophthalmic ointment Polycin 500 unit-10,000 unit/gram eye ointment   DIANE TO OU EYELASHES QHS      capecitabine (XELODA) 500 MG Tab Take 1,500 mg by mouth.      citalopram (CELEXA) 10 MG tablet citalopram 10 mg tablet   TK 1 T PO QD      cloNIDine (CATAPRES) 0.1 MG tablet Take 0.1 mg by mouth.      coenzyme Q10 100 mg capsule coenzyme Q10 100 mg capsule      diazePAM (VALIUM) 10 MG Tab diazepam 10 mg tablet   TK 1 T PO 30 MINUTES PRIOR TO MRI      diazePAM (VALIUM) 5 MG tablet   See Instructions, 1 tab Oral 1 hour prior to scan, may repeat dose at time of scan if needed., # 3 tab, 0 Refill(s), Maintenance, Pharmacy: [facility], 154.94, cm, 06/10/21 10:55:00 CDT, Height/Length Measured, 86, kg, 06/10/21 10:55:00 CDT, We...      EFFER-K disintegrating tablet Take 20 mEq by mouth 2 (two) times daily.      famotidine (PEPCID) 40 MG tablet 40 mg.      ferrous sulfate 325 (65 FE) MG EC tablet Take by mouth.      furosemide (LASIX) 20 MG tablet 20 mg.      furosemide (LASIX) 40 MG tablet   = 1 tab, Oral, Daily, PRN AS NEEDED for edema **THANK YOU**, # 30 tab, 0 Refill(s), Pharmacy: [facility], 154.94, cm, 08/12/22 11:28:00 CDT, Height/Length Measured, 84.7, kg, 08/12/22 11:28:00 CDT, Weight Dosing      hydrALAZINE (APRESOLINE) 50 MG tablet Take 1 tablet (50 mg total) by mouth 2 (two) times a day. 60 tablet 11    loratadine (CLARITIN) 10 mg tablet 10 mg.      losartan-hydrochlorothiazide 100-12.5 mg (HYZAAR) 100-12.5 mg Tab losartan 100 mg-hydrochlorothiazide 12.5 mg tablet   Take 1 tablet every day by oral route.      losartan-hydrochlorothiazide 100-12.5 mg (HYZAAR) 100-12.5 mg Tab 1 tab, Oral, Daily, # 90 tab, 3 Refill(s), Pharmacy: [facility], 154.94, cm, 12/28/23 11:17:00 CST, Height/Length Measured, 79.7, kg, 12/28/23 11:17:00 CST, Weight Dosing      losartan-hydrochlorothiazide 100-25 mg (HYZAAR) 100-25 mg per tablet       nitroGLYCERIN (NITROSTAT) 0.4 MG SL tablet   See Instructions, DISSOLVE 1 TABLET UNDER TONGUE EVERY 5 MINS, UP TO 3 DOSES AS NEEDED FOR CHEST PAIN, # 25 tab, 3 Refill(s), Pharmacy: [facility], 154.94, cm, 06/10/21 10:55:00 CDT, Height/Length Measured, 86, kg, 06/10/21 10:55:00...      potassium chloride SA (K-DUR,KLOR-CON) 20 MEQ tablet potassium chloride ER 20 mEq tablet,extended release(part/cryst)      pravastatin (PRAVACHOL) 10 MG tablet pravastatin 10 mg tablet      pravastatin (PRAVACHOL) 20 MG tablet pravastatin 20 mg tablet   TK 1 T PO D      pravastatin (PRAVACHOL) 40 MG tablet pravastatin 40 mg tablet      umeclidinium-vilanteroL (ANORO ELLIPTA) 62.5-25 mcg/actuation DsDv Anoro Ellipta 62.5 mcg-25 mcg/actuation powder for inhalation      levETIRAcetam (KEPPRA) 500 MG Tab Take 1 tablet (500 mg total) by mouth 2 (two) times daily for 4 days. 8 tablet 0     No current facility-administered medications for this visit.     Review of patient's allergies indicates:   Allergen Reactions    Morphine        Review of Systems   Respiratory:  Negative for choking.    Musculoskeletal:  Positive for arthralgias.   Skin:  Positive for  "wound.   Neurological:  Positive for numbness.   All other systems reviewed and are negative.      Objective:      Vitals:    02/20/24 1122   BP: (!) 141/78   Pulse: 78   SpO2: 95%   Weight: 81.6 kg (180 lb)   Height: 4' 11" (1.499 m)     Physical Exam  Vitals and nursing note reviewed.   Constitutional:       Appearance: Normal appearance.   Cardiovascular:      Pulses:           Dorsalis pedis pulses are 1+ on the right side and 1+ on the left side.        Posterior tibial pulses are 0 on the right side and 0 on the left side.   Pulmonary:      Effort: Pulmonary effort is normal.   Musculoskeletal:         General: Tenderness and deformity present.      Right foot: Deformity present.      Left foot: Deformity present.        Feet:    Feet:      Right foot:      Protective Sensation: 3 sites tested.   1 site sensed.     Toenail Condition: Right toenails are abnormally thick, long and ingrown. Fungal disease present.     Left foot:      Protective Sensation: 3 sites tested.   1 site sensed.     Skin integrity: Ulcer and skin breakdown present.      Toenail Condition: Left toenails are abnormally thick, long and ingrown. Fungal disease present.  Skin:     Capillary Refill: Capillary refill takes more than 3 seconds.      Findings: Erythema present.   Neurological:      Mental Status: She is alert.      Sensory: Sensory deficit present.   Psychiatric:         Mood and Affect: Mood normal.         Behavior: Behavior normal.         Thought Content: Thought content normal.         Judgment: Judgment normal.                                                                    Assessment:       1. Ingrown nail    2. Ulcer of left foot, limited to breakdown of skin    3. Peripheral vascular disease    4. Hammer toe of left foot    5. 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  A comprehensive new patient evaluation was performed patient does have a pre ulcerative hyperkeratotic lesion on the plantar aspect of her left forefoot I advised the patient this is directly related to the bunion and hammertoe deformities that she has was able to trim and debride the area non excisionally which the patient stated felt much better it is important that she keeps this area well moisturized and hydrated and wear shoes that give her appropriate support.  Bacitracin ointment and a light dressing was applied to the area patient also had ingrowing nails both medial lateral border bilateral hallux I was able to aggressively trim and debride these areas patient did not require a nail avulsion.  Recommended follow-up is every 2 months unless the patient has any problems questions or concerns sooner.  Was in agreement with rescheduling her in about 2 months the pre ulcerative area on the bottom of the left foot has become excessively painful making it difficult for her to walk she states he ingrown toenails have also been very painful since I saw her 3 months ago.  Patient does have poor circulation which puts her at increased risk for complication as discussed.   This note was created using [employer] voice recognition software that occasionally misinterpreted phrases or words.

## 2024-05-07 ENCOUNTER — OFFICE VISIT (OUTPATIENT)
Dept: PODIATRY | Facility: CLINIC | Age: 89
End: 2024-05-07
Payer: MEDICARE

## 2024-05-07 VITALS
SYSTOLIC BLOOD PRESSURE: 150 MMHG | HEIGHT: 59 IN | WEIGHT: 179.88 LBS | DIASTOLIC BLOOD PRESSURE: 78 MMHG | BODY MASS INDEX: 36.26 KG/M2 | HEART RATE: 71 BPM

## 2024-05-07 DIAGNOSIS — I73.9 PERIPHERAL VASCULAR DISEASE: ICD-10-CM

## 2024-05-07 DIAGNOSIS — L97.521 ULCER OF LEFT FOOT, LIMITED TO BREAKDOWN OF SKIN: ICD-10-CM

## 2024-05-07 DIAGNOSIS — M20.12 VALGUS DEFORMITY OF BOTH GREAT TOES: ICD-10-CM

## 2024-05-07 DIAGNOSIS — R26.81 UNSTEADY GAIT: ICD-10-CM

## 2024-05-07 DIAGNOSIS — M20.42 HAMMER TOE OF LEFT FOOT: ICD-10-CM

## 2024-05-07 DIAGNOSIS — L60.0 INGROWN NAIL: Primary | ICD-10-CM

## 2024-05-07 DIAGNOSIS — M20.11 VALGUS DEFORMITY OF BOTH GREAT TOES: ICD-10-CM

## 2024-05-07 PROCEDURE — 99213 OFFICE O/P EST LOW 20 MIN: CPT | Mod: S$GLB,,, | Performed by: PODIATRIST

## 2024-05-07 PROCEDURE — 99999 PR PBB SHADOW E&M-EST. PATIENT-LVL IV: CPT | Mod: PBBFAC,,, | Performed by: PODIATRIST

## 2024-07-02 ENCOUNTER — OFFICE VISIT (OUTPATIENT)
Dept: PODIATRY | Facility: CLINIC | Age: 89
End: 2024-07-02
Payer: MEDICARE

## 2024-07-02 VITALS
SYSTOLIC BLOOD PRESSURE: 122 MMHG | DIASTOLIC BLOOD PRESSURE: 69 MMHG | HEART RATE: 56 BPM | WEIGHT: 179.88 LBS | HEIGHT: 59 IN | BODY MASS INDEX: 36.26 KG/M2

## 2024-07-02 DIAGNOSIS — R26.81 UNSTEADY GAIT: ICD-10-CM

## 2024-07-02 DIAGNOSIS — L97.521 ULCER OF LEFT FOOT, LIMITED TO BREAKDOWN OF SKIN: ICD-10-CM

## 2024-07-02 DIAGNOSIS — L60.0 INGROWN NAIL: Primary | ICD-10-CM

## 2024-07-02 DIAGNOSIS — M20.42 HAMMER TOE OF LEFT FOOT: ICD-10-CM

## 2024-07-02 DIAGNOSIS — M20.11 VALGUS DEFORMITY OF BOTH GREAT TOES: ICD-10-CM

## 2024-07-02 DIAGNOSIS — I73.9 PERIPHERAL VASCULAR DISEASE: ICD-10-CM

## 2024-07-02 DIAGNOSIS — M20.12 VALGUS DEFORMITY OF BOTH GREAT TOES: ICD-10-CM

## 2024-07-02 PROCEDURE — 99214 OFFICE O/P EST MOD 30 MIN: CPT | Mod: S$GLB,,, | Performed by: PODIATRIST

## 2024-07-02 PROCEDURE — 99999 PR PBB SHADOW E&M-EST. PATIENT-LVL V: CPT | Mod: PBBFAC,,, | Performed by: PODIATRIST

## 2024-07-02 RX ORDER — AMLODIPINE BESYLATE 2.5 MG/1
2.5 TABLET ORAL
COMMUNITY

## 2024-07-02 RX ORDER — DEXTROMETHORPHAN HYDROBROMIDE, GUAIFENESIN 5; 100 MG/5ML; MG/5ML
1300 LIQUID ORAL
COMMUNITY
Start: 2024-06-16

## 2024-07-02 RX ORDER — LOSARTAN POTASSIUM AND HYDROCHLOROTHIAZIDE 12.5; 1 MG/1; MG/1
TABLET ORAL
COMMUNITY
Start: 2024-06-04 | End: 2025-05-30

## 2024-07-05 NOTE — PROGRESS NOTES
Subjective:       Patient ID: Kristin Montiel is a 89 y.o. female.    Chief Complaint: Ankle Pain (Left ), Foot Pain (left), Ingrown Toenail, and Callouses        Patient states she has been having problems with ingrown toenails on both big toes and a painful spot on the bottom of her left foot.  Past Medical History:   Diagnosis Date    Breast cancer     Emphysema lung     Hypertension      History reviewed. No pertinent surgical history.  No family history on file.  Social History     Socioeconomic History    Marital status:    Tobacco Use    Smoking status: Never    Smokeless tobacco: Never       Current Outpatient Medications   Medication Sig Dispense Refill    acetaminophen (TYLENOL ARTHRITIS PAIN) 650 MG TbSR 1,300 mg.      albuterol (PROVENTIL/VENTOLIN HFA) 90 mcg/actuation inhaler Inhale 1 puff into the lungs.      amLODIPine (NORVASC) 2.5 MG tablet Take 2.5 mg by mouth.      bacitracin-polymyxin b (POLYSPORIN) ophthalmic ointment Polycin 500 unit-10,000 unit/gram eye ointment   DIANE TO OU EYELASHES QHS      capecitabine (XELODA) 500 MG Tab Take 1,500 mg by mouth.      citalopram (CELEXA) 10 MG tablet citalopram 10 mg tablet   TK 1 T PO QD      cloNIDine (CATAPRES) 0.1 MG tablet Take 0.1 mg by mouth.      coenzyme Q10 100 mg capsule coenzyme Q10 100 mg capsule      EFFER-K disintegrating tablet Take 20 mEq by mouth 2 (two) times daily.      famotidine (PEPCID) 40 MG tablet 40 mg.      ferrous sulfate 325 (65 FE) MG EC tablet Take by mouth.      furosemide (LASIX) 20 MG tablet 20 mg.      furosemide (LASIX) 40 MG tablet   = 1 tab, Oral, Daily, PRN AS NEEDED for edema **THANK YOU**, # 30 tab, 0 Refill(s), Pharmacy: [facility], 154.94, cm, 08/12/22 11:28:00 CDT, Height/Length Measured, 84.7, kg, 08/12/22 11:28:00 CDT, Weight Dosing      hydrALAZINE (APRESOLINE) 50 MG tablet Take 1 tablet (50 mg total) by mouth 2 (two) times a day. 60 tablet 11    loratadine (CLARITIN) 10 mg tablet 10 mg.       "losartan-hydrochlorothiazide 100-12.5 mg (HYZAAR) 100-12.5 mg Tab 1 tab, Oral, Daily, # 90 tab, 3 Refill(s), Pharmacy: [facility], 154.94, cm, 05/01/24 11:58:00 CDT, Height/Length Measured, 83.9, kg, 05/01/24 11:58:00 CDT, Weight Dosing      nitroGLYCERIN (NITROSTAT) 0.4 MG SL tablet   See Instructions, DISSOLVE 1 TABLET UNDER TONGUE EVERY 5 MINS, UP TO 3 DOSES AS NEEDED FOR CHEST PAIN, # 25 tab, 3 Refill(s), [facility], 154.94, cm, 06/10/21 10:55:00 CDT, Height/Length Measured, 86, kg, 06/10/21 10:55:00...      potassium chloride SA (K-DUR,KLOR-CON) 20 MEQ tablet potassium chloride ER 20 mEq tablet,extended release(part/cryst)      umeclidinium-vilanteroL (ANORO ELLIPTA) 62.5-25 mcg/actuation DsDv Anoro Ellipta 62.5 mcg-25 mcg/actuation powder for inhalation       No current facility-administered medications for this visit.     Review of patient's allergies indicates:   Allergen Reactions    Morphine        Review of Systems   Musculoskeletal:  Positive for arthralgias.   Skin:  Positive for wound.   Neurological:  Positive for numbness.   All other systems reviewed and are negative.      Objective:      Vitals:    07/02/24 1146   BP: 122/69   BP Location: Left forearm   Patient Position: Sitting   Pulse: (!) 56   Weight: 81.6 kg (179 lb 14.3 oz)   Height: 4' 11" (1.499 m)     Physical Exam  Vitals and nursing note reviewed.   Constitutional:       Appearance: Normal appearance.   Cardiovascular:      Pulses:           Dorsalis pedis pulses are 1+ on the right side and 1+ on the left side.        Posterior tibial pulses are 0 on the right side and 0 on the left side.   Pulmonary:      Effort: Pulmonary effort is normal.   Musculoskeletal:         General: Tenderness and deformity present.      Right foot: Deformity present.      Left foot: Deformity present.        Feet:    Feet:      Right foot:      Protective Sensation: 3 sites tested.   1 site sensed.     Toenail " Condition: Right toenails are abnormally thick, long and ingrown. Fungal disease present.     Left foot:      Protective Sensation: 3 sites tested.   1 site sensed.     Skin integrity: Ulcer and skin breakdown present.      Toenail Condition: Left toenails are abnormally thick, long and ingrown. Fungal disease present.  Skin:     Capillary Refill: Capillary refill takes more than 3 seconds.      Findings: Erythema present.   Neurological:      Mental Status: She is alert.      Sensory: Sensory deficit present.   Psychiatric:         Mood and Affect: Mood normal.         Behavior: Behavior normal.         Thought Content: Thought content normal.         Judgment: Judgment normal.                                                                                                                    Assessment:       1. Ingrown nail    2. Peripheral vascular disease    3. Hammer toe of left foot    4. 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 Ulcer of left foot, limited to breakdown of skin    6. Unsteady gait        Plan:        Patient states she has been having problems with ingrown toenails on both big toes and a painful spot on the bottom of her left foot.  A comprehensive new patient evaluation was performed patient does have a pre ulcerative hyperkeratotic lesion on the plantar aspect of her left forefoot I advised the patient this is directly related to the bunion and hammertoe deformities that she has was able to trim and debride the area non excisionally which the patient stated felt much better it is important that she keeps this area well moisturized and hydrated and wear shoes that give her appropriate support.  Bacitracin ointment and a light dressing was applied to the area patient also had ingrowing nails both medial lateral border bilateral hallux I was able to aggressively trim and debride these areas patient did not require a nail avulsion.  Recommended follow-up is every 2 months unless the  patient has any problems questions or concerns sooner.  Was in agreement with rescheduling her in about 2 months the pre ulcerative area on the bottom of the left foot has become excessively painful making it difficult for her to walk she states he ingrown toenails have also been very painful since I saw her 2 months ago.  I have recommended the use of 40% urea cream and horseshoe pads I gave the patient the appropriate information so that she can order these items online.  Patient does have poor circulation which puts her at increased risk for complication as discussed.  Patient had a pacemaker placed proximally one-week ago.  Patient's daughter was present with her today I explained to the patient and the patient's daughter she needs to use the 40% urea cream as previously recommended that is the only way to slow down the buildup of the callus tissue that has causing the patient so much discomfort in the pre ulcerative areas.  This note was created using [employer] voice recognition software that occasionally misinterpreted phrases or words.

## 2024-08-06 NOTE — PROGRESS NOTES
Subjective:       Patient ID: Kristin Montiel is a 89 y.o. female.    Chief Complaint: Nail Problem and Callouses      Patient states she has been having problems with ingrown toenails on both big toes and a painful spot on the bottom of her left foot.  Past Medical History:   Diagnosis Date    Breast cancer     Emphysema lung     Hypertension      History reviewed. No pertinent surgical history.  History reviewed. No pertinent family history.  Social History     Socioeconomic History    Marital status:    Tobacco Use    Smoking status: Never    Smokeless tobacco: Never       Current Outpatient Medications   Medication Sig Dispense Refill    albuterol (PROVENTIL/VENTOLIN HFA) 90 mcg/actuation inhaler Inhale 1 puff into the lungs.      amLODIPine (NORVASC) 5 MG tablet amlodipine 5 mg tablet      capecitabine (XELODA) 500 MG Tab Take 1,500 mg by mouth.      citalopram (CELEXA) 10 MG tablet citalopram 10 mg tablet   TK 1 T PO QD      cloNIDine (CATAPRES) 0.1 MG tablet Take 0.1 mg by mouth.      famotidine (PEPCID) 40 MG tablet 40 mg.      furosemide (LASIX) 40 MG tablet   = 1 tab, Oral, Daily, PRN AS NEEDED for edema **THANK YOU**, # 30 tab, 0 Refill(s), Pharmacy: [facility], 154.94, cm, 08/12/22 11:28:00 CDT, Height/Length Measured, 84.7, kg, 08/12/22 11:28:00 CDT, Weight Dosing      hydrALAZINE (APRESOLINE) 50 MG tablet Take 1 tablet (50 mg total) by mouth 2 (two) times a day. 60 tablet 11    losartan-hydrochlorothiazide 100-25 mg (HYZAAR) 100-25 mg per tablet       umeclidinium-vilanteroL (ANORO ELLIPTA) 62.5-25 mcg/actuation DsDv Anoro Ellipta 62.5 mcg-25 mcg/actuation powder for inhalation      bacitracin-polymyxin b (POLYSPORIN) ophthalmic ointment Polycin 500 unit-10,000 unit/gram eye ointment   DIANE TO OU EYELASHES QHS      coenzyme Q10 100 mg capsule coenzyme Q10 100 mg capsule      diazePAM (VALIUM) 10 MG Tab diazepam 10 mg tablet   TK 1 T PO 30 MINUTES PRIOR TO MRI      diazePAM (VALIUM) 5 MG  "tablet   See Instructions, 1 tab Oral 1 hour prior to scan, may repeat dose at time of scan if needed., # 3 tab, 0 Refill(s), Maintenance, Pharmacy: [facility], 154.94, cm, 06/10/21 10:55:00 CDT, Height/Length Measured, 86, kg, 06/10/21 10:55:00 CDT, We...      levETIRAcetam (KEPPRA) 500 MG Tab Take 1 tablet (500 mg total) by mouth 2 (two) times daily for 4 days. 8 tablet 0    loratadine (CLARITIN) 10 mg tablet 10 mg.      losartan-hydrochlorothiazide 100-12.5 mg (HYZAAR) 100-12.5 mg Tab losartan 100 mg-hydrochlorothiazide 12.5 mg tablet   Take 1 tablet every day by oral route.      nitroGLYCERIN (NITROSTAT) 0.4 MG SL tablet   See Instructions, DISSOLVE 1 TABLET UNDER TONGUE EVERY 5 MINS, UP TO 3 DOSES AS NEEDED FOR CHEST PAIN, # 25 tab, 3 Refill(s), Pharmacy: [facility], 154.94, cm, 06/10/21 10:55:00 CDT, Height/Length Measured, 86, kg, 06/10/21 10:55:00...      potassium chloride SA (K-DUR,KLOR-CON) 20 MEQ tablet potassium chloride ER 20 mEq tablet,extended release(part/cryst)      pravastatin (PRAVACHOL) 10 MG tablet pravastatin 10 mg tablet      pravastatin (PRAVACHOL) 20 MG tablet pravastatin 20 mg tablet   TK 1 T PO D      pravastatin (PRAVACHOL) 40 MG tablet pravastatin 40 mg tablet       No current facility-administered medications for this visit.     Review of patient's allergies indicates:   Allergen Reactions    Morphine     Oxycodone-acetaminophen Itching    Sulfa (sulfonamide antibiotics)        Review of Systems   Musculoskeletal:  Positive for arthralgias.   Skin:  Positive for wound.   Neurological:  Positive for numbness.   All other systems reviewed and are negative.    Objective:      Vitals:    09/01/22 1115   BP: (!) 142/74   Pulse: 67   Temp: 98 °F (36.7 °C)   Weight: 83.9 kg (185 lb)   Height: 5' 1" (1.549 m)     Physical Exam  Vitals and nursing note reviewed.   Constitutional:       Appearance: Normal appearance.   Cardiovascular:      Pulses:           Dorsalis " pedis pulses are 1+ on the right side and 1+ on the left side.        Posterior tibial pulses are 0 on the right side and 0 on the left side.   Pulmonary:      Effort: Pulmonary effort is normal.   Musculoskeletal:         General: Tenderness and deformity present.      Right foot: Deformity present.      Left foot: Deformity present.        Feet:    Feet:      Right foot:      Protective Sensation: 3 sites tested.   1 site sensed.     Toenail Condition: Right toenails are abnormally thick, long and ingrown. Fungal disease present.     Left foot:      Protective Sensation: 3 sites tested.   1 site sensed.     Skin integrity: Ulcer and skin breakdown present.      Toenail Condition: Left toenails are abnormally thick, long and ingrown. Fungal disease present.  Skin:     Capillary Refill: Capillary refill takes more than 3 seconds.      Findings: Erythema present.   Neurological:      Mental Status: She is alert.      Sensory: Sensory deficit present.   Psychiatric:         Mood and Affect: Mood normal.         Behavior: Behavior normal.         Thought Content: Thought content normal.         Judgment: Judgment normal.                Assessment:       1. Ingrown nail    2. Peripheral vascular disease    3. 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 Hammer toe of left foot        Plan:        Patient states she has been having problems with ingrown toenails on both big toes and a painful spot on the bottom of her left foot.  A comprehensive new patient evaluation was performed patient does have a pre ulcerative hyperkeratotic lesion on the plantar aspect of her left forefoot I advised the patient this is directly related to the bunion and hammertoe deformities that she has was able to trim and debride the area non excisionally which the patient stated felt much better it is important that she keeps this area well moisturized and hydrated and wear shoes that give her appropriate support.  Bacitracin ointment  and a light dressing was applied to the area patient also had ingrowing nails both medial lateral border bilateral hallux I was able to aggressively trim and debride these areas patient did not require a nail avulsion.  Recommended follow-up is every 3-4 months unless the patient has any problems questions or concerns sooner.  I have recommended the application of Vicks vapor rub to the toenails twice a day every day advising the patient typically over 4-6 months she will see improvement in the fungal infection in the nail.  Patient does have poor circulation which puts her at increased risk for complication as discussed.This note was created using [employer] voice recognition software that occasionally misinterpreted phrases or words.     full weight-bearing

## 2024-09-05 ENCOUNTER — OFFICE VISIT (OUTPATIENT)
Dept: PODIATRY | Facility: CLINIC | Age: 89
End: 2024-09-05
Payer: MEDICARE

## 2024-09-05 VITALS
DIASTOLIC BLOOD PRESSURE: 60 MMHG | HEIGHT: 59 IN | HEART RATE: 56 BPM | WEIGHT: 179.88 LBS | SYSTOLIC BLOOD PRESSURE: 118 MMHG | BODY MASS INDEX: 36.26 KG/M2

## 2024-09-05 DIAGNOSIS — M20.42 HAMMER TOE OF LEFT FOOT: ICD-10-CM

## 2024-09-05 DIAGNOSIS — M20.11 VALGUS DEFORMITY OF BOTH GREAT TOES: ICD-10-CM

## 2024-09-05 DIAGNOSIS — L60.0 INGROWN NAIL: Primary | ICD-10-CM

## 2024-09-05 DIAGNOSIS — R26.81 UNSTEADY GAIT: ICD-10-CM

## 2024-09-05 DIAGNOSIS — I73.9 PERIPHERAL VASCULAR DISEASE: ICD-10-CM

## 2024-09-05 DIAGNOSIS — L97.521 ULCER OF LEFT FOOT, LIMITED TO BREAKDOWN OF SKIN: ICD-10-CM

## 2024-09-05 DIAGNOSIS — M20.12 VALGUS DEFORMITY OF BOTH GREAT TOES: ICD-10-CM

## 2024-09-05 PROCEDURE — 99213 OFFICE O/P EST LOW 20 MIN: CPT | Mod: S$GLB,,, | Performed by: PODIATRIST

## 2024-09-05 PROCEDURE — 99999 PR PBB SHADOW E&M-EST. PATIENT-LVL V: CPT | Mod: PBBFAC,,, | Performed by: PODIATRIST

## 2024-09-05 RX ORDER — ALPRAZOLAM 0.25 MG/1
0.25 TABLET ORAL
COMMUNITY
Start: 2024-07-23

## 2024-09-06 NOTE — PROGRESS NOTES
Subjective:       Patient ID: Kristin Montiel is a 89 y.o. female.    Chief Complaint: Flat Foot and Ingrown Toenail        Patient states she has been having problems with ingrown toenails on both big toes and a painful spot on the bottom of her left foot.  Past Medical History:   Diagnosis Date    Breast cancer     Emphysema lung     Hypertension      History reviewed. No pertinent surgical history.  No family history on file.  Social History     Socioeconomic History    Marital status:    Tobacco Use    Smoking status: Never    Smokeless tobacco: Never       Current Outpatient Medications   Medication Sig Dispense Refill    XANAX 0.25 mg tablet 0.25 mg.      acetaminophen (TYLENOL ARTHRITIS PAIN) 650 MG TbSR 1,300 mg.      albuterol (PROVENTIL/VENTOLIN HFA) 90 mcg/actuation inhaler Inhale 1 puff into the lungs.      amLODIPine (NORVASC) 2.5 MG tablet Take 2.5 mg by mouth.      bacitracin-polymyxin b (POLYSPORIN) ophthalmic ointment Polycin 500 unit-10,000 unit/gram eye ointment   DIANE TO OU EYELASHES QHS      capecitabine (XELODA) 500 MG Tab Take 1,500 mg by mouth.      citalopram (CELEXA) 10 MG tablet citalopram 10 mg tablet   TK 1 T PO QD      cloNIDine (CATAPRES) 0.1 MG tablet Take 0.1 mg by mouth.      coenzyme Q10 100 mg capsule coenzyme Q10 100 mg capsule      EFFER-K disintegrating tablet Take 20 mEq by mouth 2 (two) times daily.      famotidine (PEPCID) 40 MG tablet 40 mg.      ferrous sulfate 325 (65 FE) MG EC tablet Take by mouth.      furosemide (LASIX) 20 MG tablet 20 mg.      furosemide (LASIX) 40 MG tablet   = 1 tab, Oral, Daily, PRN AS NEEDED for edema **THANK YOU**, # 30 tab, 0 Refill(s), Pharmacy: [facility], 154.94, cm, 08/12/22 11:28:00 CDT, Height/Length Measured, 84.7, kg, 08/12/22 11:28:00 CDT, Weight Dosing      hydrALAZINE (APRESOLINE) 50 MG tablet Take 1 tablet (50 mg total) by mouth 2 (two) times a day. 60 tablet 11    loratadine (CLARITIN) 10 mg tablet 10 mg.       "losartan-hydrochlorothiazide 100-12.5 mg (HYZAAR) 100-12.5 mg Tab 1 tab, Oral, Daily, # 90 tab, 3 Refill(s), Pharmacy: [facility], 154.94, cm, 05/01/24 11:58:00 CDT, Height/Length Measured, 83.9, kg, 05/01/24 11:58:00 CDT, Weight Dosing      nitroGLYCERIN (NITROSTAT) 0.4 MG SL tablet   See Instructions, DISSOLVE 1 TABLET UNDER TONGUE EVERY 5 MINS, UP TO 3 DOSES AS NEEDED FOR CHEST PAIN, # 25 tab, 3 Refill(s), [facility], 154.94, cm, 06/10/21 10:55:00 CDT, Height/Length Measured, 86, kg, 06/10/21 10:55:00...      potassium chloride SA (K-DUR,KLOR-CON) 20 MEQ tablet potassium chloride ER 20 mEq tablet,extended release(part/cryst)      umeclidinium-vilanteroL (ANORO ELLIPTA) 62.5-25 mcg/actuation DsDv Anoro Ellipta 62.5 mcg-25 mcg/actuation powder for inhalation       No current facility-administered medications for this visit.     Review of patient's allergies indicates:   Allergen Reactions    Morphine        Review of Systems   Musculoskeletal:  Positive for arthralgias.   Skin:  Positive for wound.   Neurological:  Positive for numbness.   All other systems reviewed and are negative.      Objective:      Vitals:    09/05/24 1125   BP: 118/60   BP Location: Right arm   Patient Position: Sitting   Pulse: (!) 56   Weight: 81.6 kg (179 lb 14.3 oz)   Height: 4' 11" (1.499 m)     Physical Exam  Vitals and nursing note reviewed.   Constitutional:       Appearance: Normal appearance.   Cardiovascular:      Pulses:           Dorsalis pedis pulses are 1+ on the right side and 1+ on the left side.        Posterior tibial pulses are 0 on the right side and 0 on the left side.   Pulmonary:      Effort: Pulmonary effort is normal.   Musculoskeletal:         General: Tenderness and deformity present.      Right foot: Deformity present.      Left foot: Deformity present.        Feet:    Feet:      Right foot:      Protective Sensation: 3 sites tested.   1 site sensed.     Toenail " Condition: Right toenails are abnormally thick, long and ingrown. Fungal disease present.     Left foot:      Protective Sensation: 3 sites tested.   1 site sensed.     Skin integrity: Ulcer and skin breakdown present.      Toenail Condition: Left toenails are abnormally thick, long and ingrown. Fungal disease present.  Skin:     Capillary Refill: Capillary refill takes more than 3 seconds.      Findings: Erythema present.   Neurological:      Mental Status: She is alert.      Sensory: Sensory deficit present.   Psychiatric:         Mood and Affect: Mood normal.         Behavior: Behavior normal.         Thought Content: Thought content normal.         Judgment: Judgment normal.                                                                                                                                  Assessment:       1. Ingrown nail    2. Ulcer of left foot, limited to breakdown of skin    3. 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 Hammer toe of left foot    5. Unsteady gait    6. Peripheral vascular disease        Plan:        Patient states she has been having problems with ingrown toenails on both big toes and a painful spot on the bottom of her left foot.  A comprehensive follow-up patient evaluation was performed patient does have a pre ulcerative hyperkeratotic lesion on the plantar aspect of her left forefoot I advised the patient this is directly related to the bunion and hammertoe deformities that she has was able to trim and debride the area non excisionally which the patient stated felt much better it is important that she keeps this area well moisturized and hydrated and wear shoes that give her appropriate support.  Bacitracin ointment and a light dressing was applied to the area patient also had ingrowing nails both medial lateral border bilateral hallux I was able to aggressively trim and debride these areas patient did not require a nail avulsion.  Recommended follow-up is every 2  months unless the patient has any problems questions or concerns sooner.  Was in agreement with rescheduling her in about 2 months the pre ulcerative area on the bottom of the left foot has become excessively painful making it difficult for her to walk she states he ingrown toenails have also been very painful since I saw her 2 months ago.  I have recommended the use of 40% urea cream and horseshoe pads I gave the patient the appropriate information so that she can order these items online.  Patient does have poor circulation which puts her at increased risk for complication as discussed.  Patient had a pacemaker placed proximally one-week ago.  Patient's daughter was present with her today I explained to the patient and the patient's daughter she needs to continue to use the 40% urea cream as previously recommended that is the only way to slow down the buildup of the callus tissue that has causing the patient so much discomfort in the pre ulcerative areas.  Patient does have decreased callus formation overlying the area of chronic ulcer she has been using the 40% urea cream and the horseshoe pads which were dispensed to her previously and some additional today.  This note was created using [employer] voice recognition software that occasionally misinterpreted phrases or words.

## 2024-11-26 ENCOUNTER — OFFICE VISIT (OUTPATIENT)
Dept: PODIATRY | Facility: CLINIC | Age: 89
End: 2024-11-26
Payer: MEDICARE

## 2024-11-26 VITALS
SYSTOLIC BLOOD PRESSURE: 140 MMHG | WEIGHT: 179.88 LBS | BODY MASS INDEX: 36.26 KG/M2 | HEIGHT: 59 IN | HEART RATE: 66 BPM | DIASTOLIC BLOOD PRESSURE: 75 MMHG

## 2024-11-26 DIAGNOSIS — L60.0 INGROWN NAIL: Primary | ICD-10-CM

## 2024-11-26 DIAGNOSIS — L97.521 ULCER OF LEFT FOOT, LIMITED TO BREAKDOWN OF SKIN: ICD-10-CM

## 2024-11-26 DIAGNOSIS — M20.42 HAMMER TOE OF LEFT FOOT: ICD-10-CM

## 2024-11-26 DIAGNOSIS — R26.81 UNSTEADY GAIT: ICD-10-CM

## 2024-11-26 PROCEDURE — 99999 PR PBB SHADOW E&M-EST. PATIENT-LVL V: CPT | Mod: PBBFAC,,, | Performed by: PODIATRIST

## 2024-11-26 PROCEDURE — 99213 OFFICE O/P EST LOW 20 MIN: CPT | Mod: S$GLB,,, | Performed by: PODIATRIST

## 2024-11-29 NOTE — PROGRESS NOTES
Subjective:       Patient ID: Kristin Montiel is a 89 y.o. female.    Chief Complaint: Ingrown Toenail        Patient states she has been having problems with ingrown toenails on both big toes and a painful spot on the bottom of her left foot.  Past Medical History:   Diagnosis Date    Breast cancer     Emphysema lung     Hypertension      History reviewed. No pertinent surgical history.  No family history on file.  Social History     Socioeconomic History    Marital status:    Tobacco Use    Smoking status: Never    Smokeless tobacco: Never       Current Outpatient Medications   Medication Sig Dispense Refill    acetaminophen (TYLENOL ARTHRITIS PAIN) 650 MG TbSR 1,300 mg.      albuterol (PROVENTIL/VENTOLIN HFA) 90 mcg/actuation inhaler Inhale 1 puff into the lungs.      amLODIPine (NORVASC) 2.5 MG tablet Take 2.5 mg by mouth.      bacitracin-polymyxin b (POLYSPORIN) ophthalmic ointment Polycin 500 unit-10,000 unit/gram eye ointment   DIANE TO OU EYELASHES QHS      capecitabine (XELODA) 500 MG Tab Take 1,500 mg by mouth.      citalopram (CELEXA) 10 MG tablet citalopram 10 mg tablet   TK 1 T PO QD      cloNIDine (CATAPRES) 0.1 MG tablet Take 0.1 mg by mouth.      coenzyme Q10 100 mg capsule coenzyme Q10 100 mg capsule      EFFER-K disintegrating tablet Take 20 mEq by mouth 2 (two) times daily.      famotidine (PEPCID) 40 MG tablet 40 mg.      ferrous sulfate 325 (65 FE) MG EC tablet Take by mouth.      furosemide (LASIX) 20 MG tablet 20 mg.      furosemide (LASIX) 40 MG tablet   = 1 tab, Oral, Daily, PRN AS NEEDED for edema **THANK YOU**, # 30 tab, 0 Refill(s), Pharmacy: [facility], 154.94, cm, 08/12/22 11:28:00 CDT, Height/Length Measured, 84.7, kg, 08/12/22 11:28:00 CDT, Weight Dosing      hydrALAZINE (APRESOLINE) 50 MG tablet Take 1 tablet (50 mg total) by mouth 2 (two) times a day. 60 tablet 11    loratadine (CLARITIN) 10 mg tablet 10 mg.      losartan-hydrochlorothiazide 100-12.5 mg (HYZAAR)  "100-12.5 mg Tab 1 tab, Oral, Daily, # 90 tab, 3 Refill(s), Pharmacy: [facility], 154.94, cm, 05/01/24 11:58:00 CDT, Height/Length Measured, 83.9, kg, 05/01/24 11:58:00 CDT, Weight Dosing      nitroGLYCERIN (NITROSTAT) 0.4 MG SL tablet   See Instructions, DISSOLVE 1 TABLET UNDER TONGUE EVERY 5 MINS, UP TO 3 DOSES AS NEEDED FOR CHEST PAIN, # 25 tab, 3 Refill(s), Pharmacy: [facility], 154.94, cm, 06/10/21 10:55:00 CDT, Height/Length Measured, 86, kg, 06/10/21 10:55:00...      potassium chloride SA (K-DUR,KLOR-CON) 20 MEQ tablet potassium chloride ER 20 mEq tablet,extended release(part/cryst)      umeclidinium-vilanteroL (ANORO ELLIPTA) 62.5-25 mcg/actuation DsDv Anoro Ellipta 62.5 mcg-25 mcg/actuation powder for inhalation      XANAX 0.25 mg tablet 0.25 mg.       No current facility-administered medications for this visit.     Review of patient's allergies indicates:   Allergen Reactions    Morphine        Review of Systems   Musculoskeletal:  Positive for arthralgias.   Skin:  Positive for wound.   Neurological:  Positive for numbness.   All other systems reviewed and are negative.      Objective:      Vitals:    11/26/24 1140   BP: (!) 140/75   BP Location: Left forearm   Patient Position: Sitting   Pulse: 66   Weight: 81.6 kg (179 lb 14.3 oz)   Height: 4' 11" (1.499 m)     Physical Exam  Vitals and nursing note reviewed.   Constitutional:       Appearance: Normal appearance.   Cardiovascular:      Pulses:           Dorsalis pedis pulses are 1+ on the right side and 1+ on the left side.        Posterior tibial pulses are 0 on the right side and 0 on the left side.   Pulmonary:      Effort: Pulmonary effort is normal.   Musculoskeletal:         General: Tenderness and deformity present.      Right foot: Deformity present.      Left foot: Deformity present.        Feet:    Feet:      Right foot:      Protective Sensation: 3 sites tested.   1 site sensed.     Toenail Condition: " Right toenails are abnormally thick, long and ingrown. Fungal disease present.     Left foot:      Protective Sensation: 3 sites tested.   1 site sensed.     Skin integrity: Ulcer and skin breakdown present.      Toenail Condition: Left toenails are abnormally thick, long and ingrown. Fungal disease present.  Skin:     Capillary Refill: Capillary refill takes more than 3 seconds.      Findings: Erythema present.   Neurological:      Mental Status: She is alert.      Sensory: Sensory deficit present.   Psychiatric:         Mood and Affect: Mood normal.         Behavior: Behavior normal.         Thought Content: Thought content normal.         Judgment: Judgment normal.                                    Assessment:       1. Ingrown nail    2. Hammer toe of left foot    3. Ulcer of left foot, limited to breakdown of skin    4. Unsteady gait        Plan:        Patient states she has been having problems with ingrown toenails on both big toes and a painful spot on the bottom of her left foot.  A comprehensive follow-up patient evaluation was performed patient does have a pre ulcerative hyperkeratotic lesion on the plantar aspect of her left forefoot I advised the patient this is directly related to the bunion and hammertoe deformities that she has was able to trim and debride the area non excisionally which the patient stated felt much better it is important that she keeps this area well moisturized and hydrated and wear shoes that give her appropriate support.  Bacitracin ointment and a light dressing was applied to the area patient also had ingrowing nails both medial lateral border bilateral hallux I was able to aggressively trim and debride these areas patient did not require a nail avulsion.  Recommended follow-up is every 2 months unless the patient has any problems questions or concerns sooner.  Was in agreement with rescheduling her in about 2 months the pre ulcerative area on the bottom of the left foot has  become excessively painful making it difficult for her to walk she states he ingrown toenails have also been very painful since I saw her 2 months ago.  I have recommended the use of 40% urea cream and horseshoe pads I gave the patient the appropriate information so that she can order these items online.  Patient does have poor circulation which puts her at increased risk for complication as discussed.  Patient had a pacemaker placed proximally one-week ago.  Patient's daughter was present with her today I explained to the patient and the patient's daughter she needs to continue to use the 40% urea cream as previously recommended that is the only way to slow down the buildup of the callus tissue that has causing the patient so much discomfort in the pre ulcerative areas.  Patient does have decreased callus formation overlying the area of chronic ulcer she has been using the 40% urea cream and the horseshoe pads which were dispensed to her previously and some additional today.  This note was created using [employer] voice recognition software that occasionally misinterpreted phrases or words.

## 2025-01-28 ENCOUNTER — OFFICE VISIT (OUTPATIENT)
Dept: PODIATRY | Facility: CLINIC | Age: OVER 89
End: 2025-01-28
Payer: MEDICARE

## 2025-01-28 VITALS
BODY MASS INDEX: 36.26 KG/M2 | DIASTOLIC BLOOD PRESSURE: 68 MMHG | HEART RATE: 65 BPM | WEIGHT: 179.88 LBS | SYSTOLIC BLOOD PRESSURE: 127 MMHG | HEIGHT: 59 IN

## 2025-01-28 DIAGNOSIS — M20.12 VALGUS DEFORMITY OF BOTH GREAT TOES: ICD-10-CM

## 2025-01-28 DIAGNOSIS — L97.521 ULCER OF LEFT FOOT, LIMITED TO BREAKDOWN OF SKIN: ICD-10-CM

## 2025-01-28 DIAGNOSIS — R26.81 UNSTEADY GAIT: ICD-10-CM

## 2025-01-28 DIAGNOSIS — I73.9 PERIPHERAL VASCULAR DISEASE: ICD-10-CM

## 2025-01-28 DIAGNOSIS — M20.11 VALGUS DEFORMITY OF BOTH GREAT TOES: ICD-10-CM

## 2025-01-28 DIAGNOSIS — M20.42 HAMMER TOE OF LEFT FOOT: ICD-10-CM

## 2025-01-28 DIAGNOSIS — L60.0 INGROWN NAIL: Primary | ICD-10-CM

## 2025-01-28 PROCEDURE — 99999 PR PBB SHADOW E&M-EST. PATIENT-LVL V: CPT | Mod: PBBFAC,,, | Performed by: PODIATRIST

## 2025-01-28 PROCEDURE — 99213 OFFICE O/P EST LOW 20 MIN: CPT | Mod: S$GLB,,, | Performed by: PODIATRIST

## 2025-02-01 NOTE — PROGRESS NOTES
Subjective:       Patient ID: Kristin Montiel is a 89 y.o. female.    Chief Complaint: Ingrown Toenail        Patient states she has been having problems with ingrown toenails on both big toes and a painful spot on the bottom of her left foot.  Past Medical History:   Diagnosis Date    Breast cancer     Emphysema lung     Hypertension      History reviewed. No pertinent surgical history.  No family history on file.  Social History     Socioeconomic History    Marital status:    Tobacco Use    Smoking status: Never    Smokeless tobacco: Never       Current Outpatient Medications   Medication Sig Dispense Refill    acetaminophen (TYLENOL ARTHRITIS PAIN) 650 MG TbSR 1,300 mg.      albuterol (PROVENTIL/VENTOLIN HFA) 90 mcg/actuation inhaler Inhale 1 puff into the lungs.      amLODIPine (NORVASC) 2.5 MG tablet Take 2.5 mg by mouth.      bacitracin-polymyxin b (POLYSPORIN) ophthalmic ointment Polycin 500 unit-10,000 unit/gram eye ointment   DIANE TO OU EYELASHES QHS      capecitabine (XELODA) 500 MG Tab Take 1,500 mg by mouth.      citalopram (CELEXA) 10 MG tablet citalopram 10 mg tablet   TK 1 T PO QD      cloNIDine (CATAPRES) 0.1 MG tablet Take 0.1 mg by mouth.      coenzyme Q10 100 mg capsule coenzyme Q10 100 mg capsule      EFFER-K disintegrating tablet Take 20 mEq by mouth 2 (two) times daily.      famotidine (PEPCID) 40 MG tablet 40 mg.      ferrous sulfate 325 (65 FE) MG EC tablet Take by mouth.      furosemide (LASIX) 20 MG tablet 20 mg.      furosemide (LASIX) 40 MG tablet   = 1 tab, Oral, Daily, PRN AS NEEDED for edema **THANK YOU**, # 30 tab, 0 Refill(s), Pharmacy: [facility], 154.94, cm, 08/12/22 11:28:00 CDT, Height/Length Measured, 84.7, kg, 08/12/22 11:28:00 CDT, Weight Dosing      hydrALAZINE (APRESOLINE) 50 MG tablet Take 1 tablet (50 mg total) by mouth 2 (two) times a day. 60 tablet 11    loratadine (CLARITIN) 10 mg tablet 10 mg.      losartan-hydrochlorothiazide 100-12.5 mg (HYZAAR)  "100-12.5 mg Tab 1 tab, Oral, Daily, # 90 tab, 3 Refill(s), Pharmacy: [facility], 154.94, cm, 05/01/24 11:58:00 CDT, Height/Length Measured, 83.9, kg, 05/01/24 11:58:00 CDT, Weight Dosing      nitroGLYCERIN (NITROSTAT) 0.4 MG SL tablet   See Instructions, DISSOLVE 1 TABLET UNDER TONGUE EVERY 5 MINS, UP TO 3 DOSES AS NEEDED FOR CHEST PAIN, # 25 tab, 3 Refill(s), Pharmacy: [facility], 154.94, cm, 06/10/21 10:55:00 CDT, Height/Length Measured, 86, kg, 06/10/21 10:55:00...      potassium chloride SA (K-DUR,KLOR-CON) 20 MEQ tablet potassium chloride ER 20 mEq tablet,extended release(part/cryst)      umeclidinium-vilanteroL (ANORO ELLIPTA) 62.5-25 mcg/actuation DsDv Anoro Ellipta 62.5 mcg-25 mcg/actuation powder for inhalation      XANAX 0.25 mg tablet 0.25 mg.       No current facility-administered medications for this visit.     Review of patient's allergies indicates:   Allergen Reactions    Morphine        Review of Systems   Musculoskeletal:  Positive for arthralgias.   Skin:  Positive for wound.   Neurological:  Positive for numbness.   All other systems reviewed and are negative.      Objective:      Vitals:    01/28/25 1104   BP: 127/68   BP Location: Left forearm   Patient Position: Sitting   Pulse: 65   Weight: 81.6 kg (179 lb 14.3 oz)   Height: 4' 11" (1.499 m)     Physical Exam  Vitals and nursing note reviewed.   Constitutional:       Appearance: Normal appearance.   Cardiovascular:      Pulses:           Dorsalis pedis pulses are 1+ on the right side and 1+ on the left side.        Posterior tibial pulses are 0 on the right side and 0 on the left side.   Pulmonary:      Effort: Pulmonary effort is normal.   Musculoskeletal:         General: Tenderness and deformity present.      Right foot: Deformity present.      Left foot: Deformity present.        Feet:    Feet:      Right foot:      Protective Sensation: 3 sites tested.   1 site sensed.     Toenail Condition: Right " toenails are abnormally thick, long and ingrown. Fungal disease present.     Left foot:      Protective Sensation: 3 sites tested.   1 site sensed.     Skin integrity: Ulcer and skin breakdown present.      Toenail Condition: Left toenails are abnormally thick, long and ingrown. Fungal disease present.  Skin:     Capillary Refill: Capillary refill takes more than 3 seconds.      Findings: Erythema present.   Neurological:      Mental Status: She is alert.      Sensory: Sensory deficit present.   Psychiatric:         Mood and Affect: Mood normal.         Behavior: Behavior normal.         Thought Content: Thought content normal.         Judgment: Judgment normal.                                                      Assessment:       1. Ingrown nail    2. Peripheral vascular disease    3. Hammer toe of left foot    4. 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 Unsteady gait    6. Ulcer of left foot, limited to breakdown of skin        Plan:        Patient states she has been having problems with ingrown toenails on both big toes and a painful spot on the bottom of her left foot.  A comprehensive follow-up patient evaluation was performed patient does have a pre ulcerative hyperkeratotic lesion on the plantar aspect of her left forefoot I advised the patient this is directly related to the bunion and hammertoe deformities that she has was able to trim and debride the area non excisionally which the patient stated felt much better it is important that she keeps this area well moisturized and hydrated and wear shoes that give her appropriate support.  Bacitracin ointment and a light dressing was applied to the area patient also had ingrowing nails both medial lateral border bilateral hallux I was able to aggressively trim and debride these areas patient did not require a nail avulsion.  Recommended follow-up is every 2 months unless the patient has any problems questions or concerns sooner.  Was in agreement  with rescheduling her in about 2 months the pre ulcerative area on the bottom of the left foot has become excessively painful making it difficult for her to walk she states he ingrown toenails have also been very painful since I saw her 2 months ago.  I have recommended the use of 40% urea cream and horseshoe pads I gave the patient the appropriate information so that she can order these items online.  Patient does have poor circulation which puts her at increased risk for complication as discussed.  Patient had a pacemaker placed proximally one-week ago.  Patient's daughter was present with her today I explained to the patient and the patient's daughter she needs to continue to use the 40% urea cream as previously recommended that is the only way to slow down the buildup of the callus tissue that has causing the patient so much discomfort in the pre ulcerative areas.  Patient does have decreased callus formation overlying the area of chronic ulcer she has been using the 40% urea cream and the horseshoe pads which were dispensed to her previously and some additional today.  Patient did have more edema in the right lower extremity today than she has had in the past.  This note was created using [email or web address] voice recognition software that occasionally misinterpreted phrases or words.

## 2025-05-13 ENCOUNTER — OFFICE VISIT (OUTPATIENT)
Dept: PODIATRY | Facility: CLINIC | Age: OVER 89
End: 2025-05-13
Payer: MEDICARE

## 2025-05-13 VITALS
DIASTOLIC BLOOD PRESSURE: 76 MMHG | HEIGHT: 59 IN | HEART RATE: 61 BPM | SYSTOLIC BLOOD PRESSURE: 134 MMHG | WEIGHT: 179.88 LBS | BODY MASS INDEX: 36.26 KG/M2

## 2025-05-13 DIAGNOSIS — M20.42 HAMMER TOE OF LEFT FOOT: ICD-10-CM

## 2025-05-13 DIAGNOSIS — L60.0 INGROWN NAIL: Primary | ICD-10-CM

## 2025-05-13 DIAGNOSIS — I73.9 PERIPHERAL VASCULAR DISEASE: ICD-10-CM

## 2025-05-13 DIAGNOSIS — L97.521 ULCER OF LEFT FOOT, LIMITED TO BREAKDOWN OF SKIN: ICD-10-CM

## 2025-05-13 DIAGNOSIS — M20.11 VALGUS DEFORMITY OF BOTH GREAT TOES: ICD-10-CM

## 2025-05-13 DIAGNOSIS — R26.81 UNSTEADY GAIT: ICD-10-CM

## 2025-05-13 DIAGNOSIS — M20.12 VALGUS DEFORMITY OF BOTH GREAT TOES: ICD-10-CM

## 2025-05-13 PROCEDURE — 99213 OFFICE O/P EST LOW 20 MIN: CPT | Mod: S$GLB,,, | Performed by: PODIATRIST

## 2025-05-13 PROCEDURE — 99999 PR PBB SHADOW E&M-EST. PATIENT-LVL III: CPT | Mod: PBBFAC,,, | Performed by: PODIATRIST

## 2025-05-14 NOTE — PROGRESS NOTES
Subjective:       Patient ID: Kristin Montiel is a 89 y.o. female.    Chief Complaint: Ingrown Toenail        Patient states she has been having problems with ingrown toenails on both big toes and a painful spot on the bottom of her left foot.  Past Medical History:   Diagnosis Date    Breast cancer     Emphysema lung     Hypertension      History reviewed. No pertinent surgical history.  No family history on file.  Social History     Socioeconomic History    Marital status:    Tobacco Use    Smoking status: Never    Smokeless tobacco: Never       Current Outpatient Medications   Medication Sig Dispense Refill    acetaminophen (TYLENOL ARTHRITIS PAIN) 650 MG TbSR 1,300 mg.      albuterol (PROVENTIL/VENTOLIN HFA) 90 mcg/actuation inhaler Inhale 1 puff into the lungs.      amLODIPine (NORVASC) 2.5 MG tablet Take 2.5 mg by mouth.      bacitracin-polymyxin b (POLYSPORIN) ophthalmic ointment Polycin 500 unit-10,000 unit/gram eye ointment   DIANE TO OU EYELASHES QHS      capecitabine (XELODA) 500 MG Tab Take 1,500 mg by mouth.      citalopram (CELEXA) 10 MG tablet citalopram 10 mg tablet   TK 1 T PO QD      cloNIDine (CATAPRES) 0.1 MG tablet Take 0.1 mg by mouth.      coenzyme Q10 100 mg capsule coenzyme Q10 100 mg capsule      EFFER-K disintegrating tablet Take 20 mEq by mouth 2 (two) times daily.      famotidine (PEPCID) 40 MG tablet 40 mg.      ferrous sulfate 325 (65 FE) MG EC tablet Take by mouth.      furosemide (LASIX) 20 MG tablet 20 mg.      furosemide (LASIX) 40 MG tablet   = 1 tab, Oral, Daily, PRN AS NEEDED for edema **THANK YOU**, # 30 tab, 0 Refill(s), Pharmacy: [facility], 154.94, cm, 08/12/22 11:28:00 CDT, Height/Length Measured, 84.7, kg, 08/12/22 11:28:00 CDT, Weight Dosing      hydrALAZINE (APRESOLINE) 50 MG tablet Take 1 tablet (50 mg total) by mouth 2 (two) times a day. 60 tablet 11    loratadine (CLARITIN) 10 mg tablet 10 mg.      losartan-hydrochlorothiazide 100-12.5 mg (HYZAAR)  "100-12.5 mg Tab 1 tab, Oral, Daily, # 90 tab, 3 Refill(s), Pharmacy: [facility], 154.94, cm, 05/01/24 11:58:00 CDT, Height/Length Measured, 83.9, kg, 05/01/24 11:58:00 CDT, Weight Dosing      nitroGLYCERIN (NITROSTAT) 0.4 MG SL tablet   See Instructions, DISSOLVE 1 TABLET UNDER TONGUE EVERY 5 MINS, UP TO 3 DOSES AS NEEDED FOR CHEST PAIN, # 25 tab, 3 Refill(s), Pharmacy: [facility], 154.94, cm, 06/10/21 10:55:00 CDT, Height/Length Measured, 86, kg, 06/10/21 10:55:00...      potassium chloride SA (K-DUR,KLOR-CON) 20 MEQ tablet potassium chloride ER 20 mEq tablet,extended release(part/cryst)      umeclidinium-vilanteroL (ANORO ELLIPTA) 62.5-25 mcg/actuation DsDv Anoro Ellipta 62.5 mcg-25 mcg/actuation powder for inhalation      XANAX 0.25 mg tablet 0.25 mg.       No current facility-administered medications for this visit.     Review of patient's allergies indicates:   Allergen Reactions    Morphine        Review of Systems   Musculoskeletal:  Positive for arthralgias.   Skin:  Positive for wound.   Neurological:  Positive for numbness.   All other systems reviewed and are negative.      Objective:      Vitals:    05/13/25 1200   BP: 134/76   Pulse: 61   Weight: 81.6 kg (179 lb 14.3 oz)   Height: 4' 11" (1.499 m)     Physical Exam  Vitals and nursing note reviewed.   Constitutional:       Appearance: Normal appearance.   Cardiovascular:      Pulses:           Dorsalis pedis pulses are 1+ on the right side and 1+ on the left side.        Posterior tibial pulses are 0 on the right side and 0 on the left side.   Pulmonary:      Effort: Pulmonary effort is normal.   Musculoskeletal:         General: Tenderness and deformity present.      Right foot: Deformity present.      Left foot: Deformity present.        Feet:    Feet:      Right foot:      Protective Sensation: 3 sites tested.   1 site sensed.     Toenail Condition: Right toenails are abnormally thick, long and ingrown. Fungal " disease present.     Left foot:      Protective Sensation: 3 sites tested.   1 site sensed.     Skin integrity: Ulcer and skin breakdown present.      Toenail Condition: Left toenails are abnormally thick, long and ingrown. Fungal disease present.  Skin:     Capillary Refill: Capillary refill takes more than 3 seconds.      Findings: Erythema present.   Neurological:      Mental Status: She is alert.      Sensory: Sensory deficit present.   Psychiatric:         Mood and Affect: Mood normal.         Behavior: Behavior normal.         Thought Content: Thought content normal.         Judgment: Judgment normal.                                                                    Assessment:       1. Ingrown nail    2. Hammer toe of left foot    3. Ulcer of left foot, limited to breakdown of skin    4. 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 Unsteady gait    6. Peripheral vascular disease        Plan:        Patient states she has been having problems with ingrown toenails on both big toes and a painful spot on the bottom of her left foot.  A comprehensive follow-up patient evaluation was performed patient does have a pre ulcerative hyperkeratotic lesion on the plantar aspect of her left forefoot I advised the patient this is directly related to the bunion and hammertoe deformities that she has was able to trim and debride the area non excisionally which the patient stated felt much better it is important that she keeps this area well moisturized and hydrated and wear shoes that give her appropriate support.  Bacitracin ointment and a light dressing was applied to the area patient also had ingrowing nails both medial lateral border bilateral hallux I was able to aggressively trim and debride these areas patient did not require a nail avulsion.  Recommended follow-up is every 2 months unless the patient has any problems questions or concerns sooner.  Was in agreement with rescheduling her in about 2 months the  pre ulcerative area on the bottom of the left foot has become excessively painful making it difficult for her to walk she states he ingrown toenails have also been very painful since I saw her 2 months ago.  I have recommended the use of 40% urea cream and horseshoe pads I gave the patient the appropriate information so that she can order these items online.  Patient does have poor circulation which puts her at increased risk for complication as discussed.  Patient had a pacemaker placed proximally one-week ago.  Patient's daughter was present with her today I explained to the patient and the patient's daughter she needs to continue to use the 40% urea cream as previously recommended that is the only way to slow down the buildup of the callus tissue that has causing the patient so much discomfort in the pre ulcerative areas.  Patient does have decreased callus formation overlying the area of chronic ulcer she has been using the 40% urea cream and the horseshoe pads which were dispensed to her previously and some additional today.  Patient did have more edema in the right lower extremity today than she has had in the past.  Patient states she was recently in the hospital and then rehab following a fall so she is late coming in for her appointment and states her feet have really been bothering her significantly.  This note was created using [employer] voice recognition software that occasionally misinterpreted phrases or words.

## 2025-08-12 ENCOUNTER — OFFICE VISIT (OUTPATIENT)
Dept: PODIATRY | Facility: CLINIC | Age: OVER 89
End: 2025-08-12
Payer: MEDICARE

## 2025-08-12 VITALS
WEIGHT: 179.88 LBS | DIASTOLIC BLOOD PRESSURE: 69 MMHG | SYSTOLIC BLOOD PRESSURE: 136 MMHG | BODY MASS INDEX: 36.26 KG/M2 | HEART RATE: 67 BPM | HEIGHT: 59 IN

## 2025-08-12 DIAGNOSIS — I73.9 PERIPHERAL VASCULAR DISEASE: ICD-10-CM

## 2025-08-12 DIAGNOSIS — L60.0 INGROWN NAIL: ICD-10-CM

## 2025-08-12 DIAGNOSIS — L97.521 ULCER OF LEFT FOOT, LIMITED TO BREAKDOWN OF SKIN: Primary | ICD-10-CM

## 2025-08-12 DIAGNOSIS — M20.11 VALGUS DEFORMITY OF BOTH GREAT TOES: ICD-10-CM

## 2025-08-12 DIAGNOSIS — M20.12 VALGUS DEFORMITY OF BOTH GREAT TOES: ICD-10-CM

## 2025-08-12 DIAGNOSIS — R26.81 UNSTEADY GAIT: ICD-10-CM

## 2025-08-12 DIAGNOSIS — M20.42 HAMMER TOE OF LEFT FOOT: ICD-10-CM

## 2025-08-12 PROCEDURE — 99999 PR PBB SHADOW E&M-EST. PATIENT-LVL V: CPT | Mod: PBBFAC,,, | Performed by: PODIATRIST

## 2025-08-12 PROCEDURE — 99213 OFFICE O/P EST LOW 20 MIN: CPT | Mod: S$GLB,,, | Performed by: PODIATRIST
